# Patient Record
Sex: MALE | Race: WHITE | NOT HISPANIC OR LATINO | Employment: FULL TIME | URBAN - METROPOLITAN AREA
[De-identification: names, ages, dates, MRNs, and addresses within clinical notes are randomized per-mention and may not be internally consistent; named-entity substitution may affect disease eponyms.]

---

## 2017-05-02 ENCOUNTER — OFFICE VISIT (OUTPATIENT)
Dept: URGENT CARE | Facility: URGENT CARE | Age: 22
End: 2017-05-02

## 2017-05-02 ENCOUNTER — RADIANT APPOINTMENT (OUTPATIENT)
Dept: GENERAL RADIOLOGY | Facility: CLINIC | Age: 22
End: 2017-05-02
Attending: PHYSICIAN ASSISTANT

## 2017-05-02 VITALS
SYSTOLIC BLOOD PRESSURE: 126 MMHG | WEIGHT: 157 LBS | OXYGEN SATURATION: 96 % | TEMPERATURE: 97.8 F | DIASTOLIC BLOOD PRESSURE: 85 MMHG | HEART RATE: 88 BPM
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
## 2017-05-02 DIAGNOSIS — M54.2 NECK PAIN: ICD-10-CM

## 2017-05-02 DIAGNOSIS — M54.2 NECK PAIN: Primary | ICD-10-CM

## 2017-05-02 PROCEDURE — 72040 X-RAY EXAM NECK SPINE 2-3 VW: CPT

## 2017-05-02 PROCEDURE — 99203 OFFICE O/P NEW LOW 30 MIN: CPT | Performed by: PHYSICIAN ASSISTANT

## 2017-05-02 RX ORDER — AMITRIPTYLINE HYDROCHLORIDE 50 MG/1
50 TABLET ORAL
COMMUNITY
Start: 2017-02-04 | End: 2023-04-13

## 2017-05-02 RX ORDER — CYCLOBENZAPRINE HCL 10 MG
TABLET ORAL
COMMUNITY
Start: 2017-02-06 | End: 2023-04-13
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
## 2023-03-15 ENCOUNTER — NURSE TRIAGE (OUTPATIENT)
Dept: NURSING | Facility: CLINIC | Age: 28
End: 2023-03-15
Payer: COMMERCIAL

## 2023-03-15 NOTE — TELEPHONE ENCOUNTER
"Patient calling reporting falling on ice 3/9/23.    Reporting hitting the back of his head, denies loss of consciousness.    Reporting ongoing headaches, neck tenderness.    Denies swelling today.    Stating headache is \"5\" on 1-10 pain scale. History of migraines.    Disposition per triage guidelines to see provider today or tomorrow in clinic.    Reviewed walk in urgent care options.    Caller verbalized understanding. Denies further questions.    Noemy Davis RN  [facility]        Reason for Disposition    After 3 days and headache persists    Additional Information    Negative: ACUTE NEUROLOGIC SYMPTOM and symptom present now    Negative: Knocked out (unconscious) > 1 minute    Negative: Seizure (convulsion) occurred  (Exception: Prior history of seizures and now alert and without Acute Neurologic Symptoms.)    Negative: Neck pain after dangerous injury (e.g., MVA, diving, trampoline, contact sports, fall > 10 feet or 3 meters)  (Exception: Neck pain began > 1 hour after injury.)    Negative: Major bleeding (actively dripping or spurting) that can't be stopped    Negative: Penetrating head injury (e.g., knife, gunshot wound, metal object)    Negative: Sounds like a life-threatening emergency to the triager    Negative: Diagnosed with a concussion within last 14 days    Negative: Can't remember what happened (amnesia)    Negative: Vomiting once or more    Negative: Watery or blood-tinged fluid dripping from the nose or ears    Negative: ACUTE NEUROLOGIC SYMPTOM and now fine    Negative: Knocked out (unconscious) < 1 minute and now fine    Negative: Severe headache    Negative: Dangerous injury (e.g., MVA, diving, trampoline, contact sports, fall > 10 feet or 3 meters) or severe blow from hard object (e.g., golf club or baseball bat)    Negative: Large swelling or bruise (> 2 inches or 5 cm)    Negative: Skin is split open or gaping (length > 1/2 inch or 12 mm)    Negative: Bleeding won't stop after " 10 minutes of direct pressure (using correct technique)    Negative: One or two 'black eyes' (bruising, purple color of eyelids), and onset within 24 hours of head injury    Negative: Taking Coumadin (warfarin) or other strong blood thinner, or known bleeding disorder (e.g., thrombocytopenia)    Negative: Age over 65 years with an area of head swelling or bruise    Negative: Sounds like a serious injury to the triager    Negative: Patient is confused or is an unreliable provider of information (e.g., dementia, severe intellectual disability, alcohol intoxication)    Negative: No prior tetanus shots (or is not fully vaccinated) and any wound (e.g., cut or scrape)    Negative: HIV positive or severe immunodeficiency (severely weak immune system) and DIRTY cut or scrape    Negative: Patient wants to be seen    Negative: Last tetanus shot > 5 years ago and DIRTY cut or scrape    Negative: Last tetanus shot > 10 years ago and CLEAN cut or scrape    Protocols used: HEAD INJURY-A-[place]

## 2023-03-16 ENCOUNTER — OFFICE VISIT (OUTPATIENT)
Dept: FAMILY MEDICINE | Facility: CLINIC | Age: 28
End: 2023-03-16
Payer: COMMERCIAL

## 2023-03-16 VITALS
HEIGHT: 69 IN | BODY MASS INDEX: 24.29 KG/M2 | OXYGEN SATURATION: 99 % | TEMPERATURE: 99 F | RESPIRATION RATE: 17 BRPM | DIASTOLIC BLOOD PRESSURE: 84 MMHG | SYSTOLIC BLOOD PRESSURE: 138 MMHG | WEIGHT: 164 LBS | HEART RATE: 88 BPM

## 2023-03-16 DIAGNOSIS — G44.86 CERVICOGENIC HEADACHE: Primary | ICD-10-CM

## 2023-03-16 PROCEDURE — 99204 OFFICE O/P NEW MOD 45 MIN: CPT | Performed by: FAMILY MEDICINE

## 2023-03-16 RX ORDER — CYCLOBENZAPRINE HCL 10 MG
5-10 TABLET ORAL 3 TIMES DAILY PRN
Qty: 45 TABLET | Refills: 0 | Status: SHIPPED | OUTPATIENT
Start: 2023-03-16 | End: 2023-04-13

## 2023-03-16 ASSESSMENT — PAIN SCALES - GENERAL: PAINLEVEL: SEVERE PAIN (6)

## 2023-03-16 ASSESSMENT — ANXIETY QUESTIONNAIRES
GAD7 TOTAL SCORE: 13
2. NOT BEING ABLE TO STOP OR CONTROL WORRYING: MORE THAN HALF THE DAYS
7. FEELING AFRAID AS IF SOMETHING AWFUL MIGHT HAPPEN: SEVERAL DAYS
3. WORRYING TOO MUCH ABOUT DIFFERENT THINGS: NEARLY EVERY DAY
1. FEELING NERVOUS, ANXIOUS, OR ON EDGE: MORE THAN HALF THE DAYS
6. BECOMING EASILY ANNOYED OR IRRITABLE: SEVERAL DAYS
5. BEING SO RESTLESS THAT IT IS HARD TO SIT STILL: MORE THAN HALF THE DAYS
IF YOU CHECKED OFF ANY PROBLEMS ON THIS QUESTIONNAIRE, HOW DIFFICULT HAVE THESE PROBLEMS MADE IT FOR YOU TO DO YOUR WORK, TAKE CARE OF THINGS AT HOME, OR GET ALONG WITH OTHER PEOPLE: SOMEWHAT DIFFICULT
GAD7 TOTAL SCORE: 13

## 2023-03-16 ASSESSMENT — PATIENT HEALTH QUESTIONNAIRE - PHQ9
SUM OF ALL RESPONSES TO PHQ QUESTIONS 1-9: 13
5. POOR APPETITE OR OVEREATING: MORE THAN HALF THE DAYS

## 2023-03-16 NOTE — PATIENT INSTRUCTIONS
Important Takeaway Points From This Visit:  You can take tylenol 1000 mg three times a day for pain.  You can take flexeril 5-10 mg three times a day. This can make you sleepy.  You can take 400-600 mg of ibuprofen three times a day with food      As always, please call with any questions or concerns. I look forward to seeing you again soon!    Take care,  Dr. Mejia    Your current medication list is printed. Please keep this with you - it is helpful to bring this current list to any other medical appointments. It can also be helpful if you ever go to the emergency room or hospital.    If you had lab testing today we will call you with the results. The phone number we will call with your results is # [phone] (home) . If this is not the best number please call our clinic and change the number.    If you need any refills, please call your pharmacy and they will contact us.    If you have any further concerns or wish to schedule another appointment, please call our office at [phone].    If you have a medical emergency, please call [phone].    Thank you for coming to [facility]!
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
## 2023-03-17 ENCOUNTER — ANCILLARY PROCEDURE (OUTPATIENT)
Dept: GENERAL RADIOLOGY | Facility: CLINIC | Age: 28
End: 2023-03-17
Attending: FAMILY MEDICINE
Payer: COMMERCIAL

## 2023-03-17 DIAGNOSIS — G44.86 CERVICOGENIC HEADACHE: ICD-10-CM

## 2023-03-17 PROBLEM — F51.04 INSOMNIA, PSYCHOPHYSIOLOGICAL: Status: ACTIVE | Noted: 2018-02-20

## 2023-03-17 PROBLEM — F90.2 ADHD (ATTENTION DEFICIT HYPERACTIVITY DISORDER), COMBINED TYPE: Status: ACTIVE | Noted: 2019-02-19

## 2023-03-17 PROBLEM — Z87.898 HISTORY OF LEARNING DISABILITY: Status: ACTIVE | Noted: 2018-02-20

## 2023-03-17 PROBLEM — F41.9 ANXIETY AND DEPRESSION: Status: ACTIVE | Noted: 2019-05-09

## 2023-03-17 PROBLEM — F32.A ANXIETY AND DEPRESSION: Status: ACTIVE | Noted: 2019-05-09

## 2023-03-17 PROBLEM — F17.200 NICOTINE USE DISORDER: Status: ACTIVE | Noted: 2019-05-09

## 2023-03-17 PROCEDURE — 72040 X-RAY EXAM NECK SPINE 2-3 VW: CPT | Mod: TC | Performed by: RADIOLOGY

## 2023-03-17 NOTE — RESULT ENCOUNTER NOTE
Please inform of results if patient has not viewed in [employer] within 3 business days.    No fracture was seen on xray    Continue with the plan of care we discussed.    Please call the clinic with any questions you may have.     Have a great day,    Dr. Childress

## 2023-04-06 ASSESSMENT — ENCOUNTER SYMPTOMS
PARESTHESIAS: 0
FREQUENCY: 0
COUGH: 0
SORE THROAT: 0
JOINT SWELLING: 0
HEMATOCHEZIA: 0
ARTHRALGIAS: 0
PALPITATIONS: 0
DYSURIA: 0
HEARTBURN: 0
EYE PAIN: 0
DIARRHEA: 0
HEMATURIA: 0
WEAKNESS: 0
DIZZINESS: 0
SHORTNESS OF BREATH: 0
FEVER: 0
NERVOUS/ANXIOUS: 1
ABDOMINAL PAIN: 0
MYALGIAS: 0
CHILLS: 0
NAUSEA: 0
CONSTIPATION: 0
HEADACHES: 1

## 2023-04-06 ASSESSMENT — PATIENT HEALTH QUESTIONNAIRE - PHQ9
SUM OF ALL RESPONSES TO PHQ QUESTIONS 1-9: 13
SUM OF ALL RESPONSES TO PHQ QUESTIONS 1-9: 13
10. IF YOU CHECKED OFF ANY PROBLEMS, HOW DIFFICULT HAVE THESE PROBLEMS MADE IT FOR YOU TO DO YOUR WORK, TAKE CARE OF THINGS AT HOME, OR GET ALONG WITH OTHER PEOPLE: SOMEWHAT DIFFICULT

## 2023-04-13 ENCOUNTER — OFFICE VISIT (OUTPATIENT)
Dept: FAMILY MEDICINE | Facility: CLINIC | Age: 28
End: 2023-04-13
Payer: COMMERCIAL

## 2023-04-13 VITALS
HEIGHT: 69 IN | TEMPERATURE: 97.8 F | SYSTOLIC BLOOD PRESSURE: 137 MMHG | RESPIRATION RATE: 24 BRPM | WEIGHT: 163 LBS | BODY MASS INDEX: 24.14 KG/M2 | DIASTOLIC BLOOD PRESSURE: 86 MMHG | HEART RATE: 74 BPM | OXYGEN SATURATION: 98 %

## 2023-04-13 DIAGNOSIS — F33.9 MAJOR DEPRESSION, RECURRENT, CHRONIC (H): ICD-10-CM

## 2023-04-13 DIAGNOSIS — F41.9 ANXIETY: ICD-10-CM

## 2023-04-13 DIAGNOSIS — F10.20 ALCOHOLISM (H): ICD-10-CM

## 2023-04-13 DIAGNOSIS — Z00.00 ROUTINE HISTORY AND PHYSICAL EXAMINATION OF ADULT: Primary | ICD-10-CM

## 2023-04-13 DIAGNOSIS — F51.04 INSOMNIA, PSYCHOPHYSIOLOGICAL: ICD-10-CM

## 2023-04-13 LAB
ALBUMIN SERPL-MCNC: 4.4 G/DL (ref 3.4–5)
ALP SERPL-CCNC: 75 U/L (ref 40–150)
ALT SERPL W P-5'-P-CCNC: 24 U/L (ref 0–70)
ANION GAP SERPL CALCULATED.3IONS-SCNC: 2 MMOL/L (ref 3–14)
AST SERPL W P-5'-P-CCNC: 19 U/L (ref 0–45)
BASOPHILS # BLD AUTO: 0.1 10E3/UL (ref 0–0.2)
BASOPHILS NFR BLD AUTO: 1 %
BILIRUB SERPL-MCNC: 0.6 MG/DL (ref 0.2–1.3)
BUN SERPL-MCNC: 12 MG/DL (ref 7–30)
CALCIUM SERPL-MCNC: 9 MG/DL (ref 8.5–10.1)
CHLORIDE BLD-SCNC: 110 MMOL/L (ref 94–109)
CO2 SERPL-SCNC: 28 MMOL/L (ref 20–32)
CREAT SERPL-MCNC: 1.01 MG/DL (ref 0.66–1.25)
EOSINOPHIL # BLD AUTO: 0.2 10E3/UL (ref 0–0.7)
EOSINOPHIL NFR BLD AUTO: 2 %
ERYTHROCYTE [DISTWIDTH] IN BLOOD BY AUTOMATED COUNT: 12.2 % (ref 10–15)
GFR SERPL CREATININE-BSD FRML MDRD: >90 ML/MIN/1.73M2
GLUCOSE BLD-MCNC: 99 MG/DL (ref 70–99)
HCT VFR BLD AUTO: 46.4 % (ref 40–53)
HGB BLD-MCNC: 16.1 G/DL (ref 13.3–17.7)
LYMPHOCYTES # BLD AUTO: 2.6 10E3/UL (ref 0.8–5.3)
LYMPHOCYTES NFR BLD AUTO: 34 %
MCH RBC QN AUTO: 29.2 PG (ref 26.5–33)
MCHC RBC AUTO-ENTMCNC: 34.7 G/DL (ref 31.5–36.5)
MCV RBC AUTO: 84 FL (ref 78–100)
MONOCYTES # BLD AUTO: 0.6 10E3/UL (ref 0–1.3)
MONOCYTES NFR BLD AUTO: 8 %
NEUTROPHILS # BLD AUTO: 4.2 10E3/UL (ref 1.6–8.3)
NEUTROPHILS NFR BLD AUTO: 55 %
PLATELET # BLD AUTO: 208 10E3/UL (ref 150–450)
POTASSIUM BLD-SCNC: 4.2 MMOL/L (ref 3.4–5.3)
PROT SERPL-MCNC: 7.5 G/DL (ref 6.8–8.8)
RBC # BLD AUTO: 5.52 10E6/UL (ref 4.4–5.9)
SODIUM SERPL-SCNC: 140 MMOL/L (ref 133–144)
TSH SERPL DL<=0.005 MIU/L-ACNC: 1.53 MU/L (ref 0.4–4)
WBC # BLD AUTO: 7.7 10E3/UL (ref 4–11)

## 2023-04-13 PROCEDURE — 99214 OFFICE O/P EST MOD 30 MIN: CPT | Mod: 25 | Performed by: PHYSICIAN ASSISTANT

## 2023-04-13 PROCEDURE — 36415 COLL VENOUS BLD VENIPUNCTURE: CPT | Performed by: PHYSICIAN ASSISTANT

## 2023-04-13 PROCEDURE — 80050 GENERAL HEALTH PANEL: CPT | Performed by: PHYSICIAN ASSISTANT

## 2023-04-13 PROCEDURE — 99395 PREV VISIT EST AGE 18-39: CPT | Performed by: PHYSICIAN ASSISTANT

## 2023-04-13 RX ORDER — CITALOPRAM HYDROBROMIDE 20 MG/1
TABLET ORAL
Qty: 30 TABLET | Refills: 0 | Status: SHIPPED | OUTPATIENT
Start: 2023-04-13 | End: 2023-05-18

## 2023-04-13 ASSESSMENT — ENCOUNTER SYMPTOMS
SORE THROAT: 0
FREQUENCY: 0
WEAKNESS: 0
CHILLS: 0
DIARRHEA: 0
JOINT SWELLING: 0
ARTHRALGIAS: 0
PALPITATIONS: 0
NAUSEA: 0
CONSTIPATION: 0
HEMATOCHEZIA: 0
HEADACHES: 1
EYE PAIN: 0
SHORTNESS OF BREATH: 0
MYALGIAS: 0
FEVER: 0
HEARTBURN: 0
DIZZINESS: 0
NERVOUS/ANXIOUS: 1
PARESTHESIAS: 0
DYSURIA: 0
HEMATURIA: 0
COUGH: 0
ABDOMINAL PAIN: 0

## 2023-04-13 ASSESSMENT — PAIN SCALES - GENERAL: PAINLEVEL: NO PAIN (0)

## 2023-04-13 NOTE — PATIENT INSTRUCTIONS
Follow up psychiatry for advanced medications/therapy options such as mood stabilizers, ECT, ketamine.    Limit alcohol intake while on medications

## 2023-04-13 NOTE — PROGRESS NOTES
SUBJECTIVE:   CC: Alhaji is an 27 year old who presents for preventative health visit.       4/13/2023     9:16 AM   Additional Questions   Roomed by feli spangler   Accompanied by self     ASSESSMENT / PLAN:  Routine history and physical examination of adult  Comment: completed    (F33.9) Major depression, recurrent, chronic (H)  (primary encounter diagnosis)  Comment: Up and down  Plan: citalopram (CELEXA) 20 MG tablet, Adult Mental         Health  Referral    Reports depression history starting in 8th grade and continuing into adulthood. Has had suicide attempts in the past and frequently feels depressed although does not have a plan for suicide. No HI or SI at this visit.   Is pushed to be seen again and restart medications due to girlfriend.     Anxiety  Comment: Chronic  Plan: Start celexa and follow up with psychiatry    (F10.20) Alcoholism (H)  Comment: Chronic  Plan: Using to self-treat for disorders. Reports up to 5 beers per night.    (F51.04) Insomnia, psychophysiological  Comment: Chronic  Plan: CBC with platelets and differential, TSH with         free T4 reflex, Comprehensive metabolic panel         (BMP + Alb, Alk Phos, ALT, AST, Total. Bili,         TP), CANCELED: Comprehensive metabolic panel         (BMP + Alb, Alk Phos, ALT, AST, Total. Bili,         TP)                    4/13/2023     9:16 AM   Patient Reported Additional Medications   Patient reports taking the following new medications none     Patient has been advised of split billing requirements and indicates understanding: Yes  Healthy Habits:     Getting at least 3 servings of Calcium per day:  NO    Bi-annual eye exam:  NO    Dental care twice a year:  Yes    Sleep apnea or symptoms of sleep apnea:  Daytime drowsiness    Diet:  Regular (no restrictions) and Breakfast skipped    Frequency of exercise:  1 day/week    Duration of exercise:  30-45 minutes    Taking medications regularly:  No    Barriers to taking medications:  Problems  remembering to take them    Medication side effects:  None    PHQ-2 Total Score: 3    Additional concerns today:  No              Today's PHQ-2 Score:       4/6/2023     7:49 PM   PHQ-2 ( 1999 [employer])   Q1: Little interest or pleasure in doing things 1   Q2: Feeling down, depressed or hopeless 2   PHQ-2 Score 3   Q1: Little interest or pleasure in doing things Several days    Several days   Q2: Feeling down, depressed or hopeless More than half the days    More than half the days   PHQ-2 Score 3    3       Have you ever done Advance Care Planning? (For example, a Health Directive, POLST, or a discussion with a medical provider or your loved ones about your wishes): No, advance care planning information given to patient to review.  Patient declined advance care planning discussion at this time.    Social History     Tobacco Use     Smoking status: Every Day     Types: Dip, chew, snus or snuff, Other     Passive exposure: Never     Smokeless tobacco: Current     Types: Chew   Vaping Use     Vaping status: Every Day     Substances: Nicotine, Flavoring     Devices: Refillable tank   Substance Use Topics     Alcohol use: Yes             4/6/2023     7:49 PM   Alcohol Use   Prescreen: >3 drinks/day or >7 drinks/week? Yes   AUDIT SCORE  9         4/6/2023     7:49 PM   AUDIT - Alcohol Use Disorders Identification Test - Reproduced from the World Health Organization Audit 2001 (Second Edition)   1.  How often do you have a drink containing alcohol? 2 to 3 times a week   2.  How many drinks containing alcohol do you have on a typical day when you are drinking? 3 or 4   3.  How often do you have five or more drinks on one occasion? Monthly   4.  How often during the last year have you found that you were not able to stop drinking once you had started? Less than monthly   5.  How often during the last year have you failed to do what was normally expected of you because of drinking? Never   6.  How often during the last year  have you needed a first drink in the morning to get yourself going after a heavy drinking session? Never   7.  How often during the last year have you had a feeling of guilt or remorse after drinking? Less than monthly   8.  How often during the last year have you been unable to remember what happened the night before because of your drinking? Less than monthly   9.  Have you or someone else been injured because of your drinking? No   10. Has a relative, friend, doctor or other health care worker been concerned about your drinking or suggested you cut down? No   TOTAL SCORE 9       Last PSA: No results found for: PSA    Reviewed orders with patient. Reviewed health maintenance and updated orders accordingly - Yes  Lab work is in process    Reviewed and updated as needed this visit by clinical staff    Allergies               Reviewed and updated as needed this visit by Provider                 Past Medical History:   Diagnosis Date     Depressive disorder     Katelyn been dealing with depression  and anxiety.and adhd sience i was 8 or 9. I was also diagnosed  with borderline  personality disorder back in 2018      No past surgical history on file.    Review of Systems   Constitutional: Negative for chills and fever.   HENT: Negative for congestion, ear pain, hearing loss and sore throat.    Eyes: Negative for pain and visual disturbance.   Respiratory: Negative for cough and shortness of breath.    Cardiovascular: Negative for chest pain, palpitations and peripheral edema.   Gastrointestinal: Negative for abdominal pain, constipation, diarrhea, heartburn, hematochezia and nausea.   Genitourinary: Negative for dysuria, frequency, genital sores, hematuria, impotence, penile discharge and urgency.   Musculoskeletal: Negative for arthralgias, joint swelling and myalgias.   Skin: Negative for rash.   Neurological: Positive for headaches. Negative for dizziness, weakness and paresthesias.   Psychiatric/Behavioral: Negative  "for mood changes. The patient is nervous/anxious.        OBJECTIVE:   BP (!) 155/91   Pulse 74   Temp 97.8  F (36.6  C) (Tympanic)   Resp 24   Ht 1.74 m (5' 8.5\")   Wt 73.9 kg (163 lb)   SpO2 98%   BMI 24.42 kg/m      Physical Exam  GENERAL: healthy, alert and no distress  NECK: no adenopathy, no asymmetry, masses, or scars and thyroid normal to palpation  RESP: lungs clear to auscultation - no rales, rhonchi or wheezes  CV: regular rate and rhythm, normal S1 S2, no S3 or S4, no murmur, click or rub, no peripheral edema and peripheral pulses strong  ABDOMEN: soft, nontender, no hepatosplenomegaly, no masses and bowel sounds normal  MS: no gross musculoskeletal defects noted, no edema          COUNSELING:   Reviewed preventive health counseling, as reflected in patient instructions       Healthy diet/nutrition       Alcohol Use         He reports that he has been smoking dip, chew, snus or snuff and other. He has never been exposed to tobacco smoke. His smokeless tobacco use includes chew.  Nicotine/Tobacco Cessation Plan:   Self help information given to patient            [facility]  Answers for HPI/ROS submitted by the patient on 4/6/2023  If you checked off any problems, how difficult have these problems made it for you to do your work, take care of things at home, or get along with other people?: Somewhat difficult  PHQ9 TOTAL SCORE: 13      "

## 2023-04-16 ENCOUNTER — HEALTH MAINTENANCE LETTER (OUTPATIENT)
Age: 28
End: 2023-04-16

## 2023-05-11 ASSESSMENT — ANXIETY QUESTIONNAIRES
1. FEELING NERVOUS, ANXIOUS, OR ON EDGE: MORE THAN HALF THE DAYS
7. FEELING AFRAID AS IF SOMETHING AWFUL MIGHT HAPPEN: SEVERAL DAYS
3. WORRYING TOO MUCH ABOUT DIFFERENT THINGS: MORE THAN HALF THE DAYS
GAD7 TOTAL SCORE: 16
GAD7 TOTAL SCORE: 16
4. TROUBLE RELAXING: NEARLY EVERY DAY
2. NOT BEING ABLE TO STOP OR CONTROL WORRYING: MORE THAN HALF THE DAYS
7. FEELING AFRAID AS IF SOMETHING AWFUL MIGHT HAPPEN: SEVERAL DAYS
5. BEING SO RESTLESS THAT IT IS HARD TO SIT STILL: NEARLY EVERY DAY
GAD7 TOTAL SCORE: 16
7. FEELING AFRAID AS IF SOMETHING AWFUL MIGHT HAPPEN: SEVERAL DAYS
3. WORRYING TOO MUCH ABOUT DIFFERENT THINGS: MORE THAN HALF THE DAYS
2. NOT BEING ABLE TO STOP OR CONTROL WORRYING: MORE THAN HALF THE DAYS
IF YOU CHECKED OFF ANY PROBLEMS ON THIS QUESTIONNAIRE, HOW DIFFICULT HAVE THESE PROBLEMS MADE IT FOR YOU TO DO YOUR WORK, TAKE CARE OF THINGS AT HOME, OR GET ALONG WITH OTHER PEOPLE: SOMEWHAT DIFFICULT
4. TROUBLE RELAXING: NEARLY EVERY DAY
GAD7 TOTAL SCORE: 16
8. IF YOU CHECKED OFF ANY PROBLEMS, HOW DIFFICULT HAVE THESE MADE IT FOR YOU TO DO YOUR WORK, TAKE CARE OF THINGS AT HOME, OR GET ALONG WITH OTHER PEOPLE?: SOMEWHAT DIFFICULT
GAD7 TOTAL SCORE: 16
5. BEING SO RESTLESS THAT IT IS HARD TO SIT STILL: NEARLY EVERY DAY
6. BECOMING EASILY ANNOYED OR IRRITABLE: NEARLY EVERY DAY
1. FEELING NERVOUS, ANXIOUS, OR ON EDGE: MORE THAN HALF THE DAYS
7. FEELING AFRAID AS IF SOMETHING AWFUL MIGHT HAPPEN: SEVERAL DAYS
8. IF YOU CHECKED OFF ANY PROBLEMS, HOW DIFFICULT HAVE THESE MADE IT FOR YOU TO DO YOUR WORK, TAKE CARE OF THINGS AT HOME, OR GET ALONG WITH OTHER PEOPLE?: SOMEWHAT DIFFICULT
GAD7 TOTAL SCORE: 16
IF YOU CHECKED OFF ANY PROBLEMS ON THIS QUESTIONNAIRE, HOW DIFFICULT HAVE THESE PROBLEMS MADE IT FOR YOU TO DO YOUR WORK, TAKE CARE OF THINGS AT HOME, OR GET ALONG WITH OTHER PEOPLE: SOMEWHAT DIFFICULT
6. BECOMING EASILY ANNOYED OR IRRITABLE: NEARLY EVERY DAY

## 2023-05-13 ASSESSMENT — ENCOUNTER SYMPTOMS
WEAKNESS: 0
SORE THROAT: 0
CONSTIPATION: 0
ARTHRALGIAS: 0
FEVER: 0
FREQUENCY: 0
HEMATURIA: 0
COUGH: 0
MYALGIAS: 0
DIARRHEA: 0
HEMATOCHEZIA: 0
NAUSEA: 0
ABDOMINAL PAIN: 0
CHILLS: 0
PALPITATIONS: 0
SHORTNESS OF BREATH: 0
NERVOUS/ANXIOUS: 1
DYSURIA: 0
HEARTBURN: 0
JOINT SWELLING: 0
EYE PAIN: 0
PARESTHESIAS: 0
DIZZINESS: 0
HEADACHES: 1

## 2023-05-13 ASSESSMENT — PATIENT HEALTH QUESTIONNAIRE - PHQ9
SUM OF ALL RESPONSES TO PHQ QUESTIONS 1-9: 18
SUM OF ALL RESPONSES TO PHQ QUESTIONS 1-9: 18
10. IF YOU CHECKED OFF ANY PROBLEMS, HOW DIFFICULT HAVE THESE PROBLEMS MADE IT FOR YOU TO DO YOUR WORK, TAKE CARE OF THINGS AT HOME, OR GET ALONG WITH OTHER PEOPLE: SOMEWHAT DIFFICULT

## 2023-05-17 ASSESSMENT — PATIENT HEALTH QUESTIONNAIRE - PHQ9
10. IF YOU CHECKED OFF ANY PROBLEMS, HOW DIFFICULT HAVE THESE PROBLEMS MADE IT FOR YOU TO DO YOUR WORK, TAKE CARE OF THINGS AT HOME, OR GET ALONG WITH OTHER PEOPLE: SOMEWHAT DIFFICULT
SUM OF ALL RESPONSES TO PHQ QUESTIONS 1-9: 17
SUM OF ALL RESPONSES TO PHQ QUESTIONS 1-9: 17
10. IF YOU CHECKED OFF ANY PROBLEMS, HOW DIFFICULT HAVE THESE PROBLEMS MADE IT FOR YOU TO DO YOUR WORK, TAKE CARE OF THINGS AT HOME, OR GET ALONG WITH OTHER PEOPLE: SOMEWHAT DIFFICULT
SUM OF ALL RESPONSES TO PHQ QUESTIONS 1-9: 17
SUM OF ALL RESPONSES TO PHQ QUESTIONS 1-9: 17

## 2023-05-18 ENCOUNTER — TELEPHONE (OUTPATIENT)
Dept: BEHAVIORAL HEALTH | Facility: CLINIC | Age: 28
End: 2023-05-18
Payer: COMMERCIAL

## 2023-05-18 ENCOUNTER — VIRTUAL VISIT (OUTPATIENT)
Dept: BEHAVIORAL HEALTH | Facility: CLINIC | Age: 28
End: 2023-05-18
Payer: COMMERCIAL

## 2023-05-18 ENCOUNTER — VIRTUAL VISIT (OUTPATIENT)
Dept: PSYCHIATRY | Facility: CLINIC | Age: 28
End: 2023-05-18
Attending: PHYSICIAN ASSISTANT
Payer: COMMERCIAL

## 2023-05-18 DIAGNOSIS — F41.1 GAD (GENERALIZED ANXIETY DISORDER): Primary | ICD-10-CM

## 2023-05-18 DIAGNOSIS — F17.200 TOBACCO USE DISORDER: ICD-10-CM

## 2023-05-18 DIAGNOSIS — F33.1 MODERATE EPISODE OF RECURRENT MAJOR DEPRESSIVE DISORDER (H): Primary | ICD-10-CM

## 2023-05-18 DIAGNOSIS — F90.9 ATTENTION DEFICIT HYPERACTIVITY DISORDER (ADHD), UNSPECIFIED ADHD TYPE: ICD-10-CM

## 2023-05-18 DIAGNOSIS — F33.1 MAJOR DEPRESSIVE DISORDER, RECURRENT EPISODE, MODERATE (H): ICD-10-CM

## 2023-05-18 DIAGNOSIS — F17.200 NICOTINE DEPENDENCE, UNCOMPLICATED, UNSPECIFIED NICOTINE PRODUCT TYPE: ICD-10-CM

## 2023-05-18 DIAGNOSIS — F33.9 MAJOR DEPRESSION, RECURRENT, CHRONIC (H): ICD-10-CM

## 2023-05-18 DIAGNOSIS — F60.3 BORDERLINE PERSONALITY DISORDER (H): ICD-10-CM

## 2023-05-18 DIAGNOSIS — F41.1 GENERALIZED ANXIETY DISORDER: ICD-10-CM

## 2023-05-18 PROCEDURE — 90791 PSYCH DIAGNOSTIC EVALUATION: CPT | Mod: VID | Performed by: SOCIAL WORKER

## 2023-05-18 PROCEDURE — 99205 OFFICE O/P NEW HI 60 MIN: CPT | Mod: VID | Performed by: STUDENT IN AN ORGANIZED HEALTH CARE EDUCATION/TRAINING PROGRAM

## 2023-05-18 RX ORDER — METHYLPHENIDATE HYDROCHLORIDE 18 MG/1
18 TABLET ORAL EVERY MORNING
Qty: 30 TABLET | Refills: 0 | Status: SHIPPED | OUTPATIENT
Start: 2023-05-18 | End: 2023-05-19

## 2023-05-18 RX ORDER — CITALOPRAM HYDROBROMIDE 20 MG/1
30 TABLET ORAL AT BEDTIME
Qty: 45 TABLET | Refills: 1 | Status: SHIPPED | OUTPATIENT
Start: 2023-05-18 | End: 2023-06-09

## 2023-05-18 ASSESSMENT — COLUMBIA-SUICIDE SEVERITY RATING SCALE - C-SSRS
6. HAVE YOU EVER DONE ANYTHING, STARTED TO DO ANYTHING, OR PREPARED TO DO ANYTHING TO END YOUR LIFE?: NO
1. HAVE YOU WISHED YOU WERE DEAD OR WISHED YOU COULD GO TO SLEEP AND NOT WAKE UP?: YES
1. IN THE PAST MONTH, HAVE YOU WISHED YOU WERE DEAD OR WISHED YOU COULD GO TO SLEEP AND NOT WAKE UP?: YES
4. HAVE YOU HAD THESE THOUGHTS AND HAD SOME INTENTION OF ACTING ON THEM?: NO
ATTEMPT LIFETIME: NO
2. HAVE YOU ACTUALLY HAD ANY THOUGHTS OF KILLING YOURSELF?: YES
5. HAVE YOU STARTED TO WORK OUT OR WORKED OUT THE DETAILS OF HOW TO KILL YOURSELF? DO YOU INTEND TO CARRY OUT THIS PLAN?: YES
TOTAL  NUMBER OF ABORTED OR SELF INTERRUPTED ATTEMPTS LIFETIME: NO
5. HAVE YOU STARTED TO WORK OUT OR WORKED OUT THE DETAILS OF HOW TO KILL YOURSELF? DO YOU INTEND TO CARRY OUT THIS PLAN?: NO
3. HAVE YOU BEEN THINKING ABOUT HOW YOU MIGHT KILL YOURSELF?: NO
TOTAL  NUMBER OF INTERRUPTED ATTEMPTS LIFETIME: 1
4. HAVE YOU HAD THESE THOUGHTS AND HAD SOME INTENTION OF ACTING ON THEM?: NO
TOTAL  NUMBER OF INTERRUPTED ATTEMPTS PAST 3 MONTHS: NO
TOTAL  NUMBER OF INTERRUPTED ATTEMPTS LIFETIME: YES
2. HAVE YOU ACTUALLY HAD ANY THOUGHTS OF KILLING YOURSELF?: YES
ATTEMPT PAST THREE MONTHS: NO

## 2023-05-18 NOTE — NURSING NOTE
Is the patient currently in the state of [place]? YES    Visit mode:VIDEO    If the visit is dropped, the patient can be reconnected by: VIDEO VISIT: Text to cell phone: [phone]    Will anyone else be joining the visit? NO      How would you like to obtain your AVS? MyChart    Are changes needed to the allergy or medication list? NO    Reason for visit: Video Visit      Care team has reviewed attendance agreement with patient. Patient advised that two failed appointments within 6 months may lead to termination of current episode of care.      Violeta Sanchez VF

## 2023-05-18 NOTE — PROGRESS NOTES
"Virtual Visit Details    Type of service:  Video Visit     Originating Location (pt. Location): Home    Distant Location (provider location):  Off-site  Platform used for Video Visit: OneName   ADMINISTRATIVE BILLING:    Time spent interviewing patient, reviewing referral documents, obtaining and reviewing outside records, communication with other health specialists, and preparing this report on today's date  Video start: 753  Video stop: 08  Total time: 62 mins      DIAGNOSTIC PSYCHIATRIC ASSESSMENT     Name:  Alhaji Sanchez  : 1995     Patient is a 27 year old year old White, male  who presents for intake and medication management with [facility].  Patient was referred by PCP.   Patient attended the session alone.     Patient care team: Patient Care Team:  Gerry Vuong PA-C as PCP - General  Gerry Vuong PA-C as Assigned PCP    Available records in [facility] and/or Care Everywhere were reviewed today.   Per PCP on date of referral: \"stablizing long-standing severe depression/possible mood disorder\"    LANGUAGE OR COMMUNICATION BARRIERS   Are there language or communication issues or need for modification in treatment? No   Are there ethnic, cultural or Rastafarian factors that may be relevant for therapy? No  Client identified their preferred language to be fluent English in conversational context  Does the client need the assistance of an  or other support involved in therapy? No                                                 CHIEF COMPLAINT   Patient is a 27 year old,  White, male who presents for initial psychiatric evaluation with the [facility] ([facility]) for evaluation of depression, anxiety and ADHD, BPD.      HISTORY OF PRESENT ILLNESS     Per [facility] Tracie Payne during today's team-based visit:   \"Mental health issues started in 8th grade with an inpatient psychiatric admission due to threatening to stab principal and parents. Saw several therapists and " "psychologists as a child. Tried to avoid treatment. Remembers he was a \"very angry child.\" Tries to forget about his past but knows it has \"followed him.\" Times where he has put his head into the wall and rollercoaster of emotions. Bottling up his feelings and admitted to [facility] for 3 months in 2018. Ended up in and out of mental health hospitals 5 times from 2018 to 2019. Not happy with the medications and decided to go off all his medications in 2019 and has been without since then. Struggled with remembering to take his medications because there were six different ones at different times of the day. Felt like they weren't working.   Quick to anger. Overreacts and snaps at others, even his boss. Hygiene and motivation varies. Starting things and not getting anything done, at both work and home. Hard to stay on task. Easily distracted. Overthinking all the time. Knows he prefers to do things that feel good. Can get hyperfocused at times.  Dx: anxiety, depression and ADHD as a child. Borderline Personality Disorder in 2019. Provided psycheducation.  SH: started when he was 9 years old. Used to cut on his arms. Stopped in high school. Has thought about it since then and did cut in 2019, needing stitches.  SI: sitting on the freeway and thinking about ending his life. Plans to walk out into traffic and lay down and get run over. Interrupted because a friend called and needed help. In 2019.  Still has SI now but not have an active plan or intent. More feels ready that life might end if something happens. New relationship and she has two kids so he wants to improve things for her and them. Discussed crisis resources and he is aware. Discussed [facility].  Sleep: some nights able to fall asleep and then wakes up and not get back to sleep. Some nights of not getting to sleep and staying up late. Some nights of not sleeping at all. Takes naps during the day. Feels tired all the time, even with 8 hours of good " "sleep.  Appetite: eats one meal a day. Forgets to eat, not feel hungry.  Grandfather  in . Was his best friend. Bio father  when he was 5 months old. Grandfather became like a father.\"    ---Psychiatry Evaluation---    Pt agrees with assessment above.  Pt's girlfriend \"deals with a lot of the stuff I do.\" His girlfriend recommended he get help for his mental health. Pt has buried his emotions the past few years. He has been opening up to her about his dep/anx and has been having more anger outbursts. He was doing a lot of speeding down the highway between 2019-present but now has been feeling that these emotions are uncontrollable and is reacting in anger to cope.     Pt says he was on six different medicines at one time back in 2019. \"I can't even tell you all the medicines I was taking.\"  He was dx with borderline personality disorder in 2019 and agrees with the symptoms. He was also diagnosed with treatment resistant depression. \"I took that as I'm messed up in the head and I got to deal with it.\"         PSYCHIATRIC REVIEW OF SYSTEMS:   Per [facility] Tracie Payne during today's team-based visit:   PHQ9 score is 17 indicating moderately severe depression.  Suicidal ideation:  chronic, no intent or plan  GAD7 score is  is  16 indicating severe anxiety.  Depression:     Change in sleep, Lack of interest, Excessive or inappropriate guilt, Change in energy level, Difficulties concentrating, Change in appetite, Psychomotor slowing or agitation, Suicidal ideation, Feelings of hopelessness, Feelings of helplessness, Low self-worth, Ruminations, Irritability, Feeling sad, down, or depressed, Withdrawn, Poor hygeine and Anger outbursts  Christi:             Irritability, Racing thoughts, Increased activity, Decreased need for sleep, Restlessness and Impulsiveness not dx: Bipolar  Psychosis:       No Symptoms  Anxiety:           Excessive worry, Nervousness, Physical complaints, such as headaches, stomachaches, " "muscle tension, Sleep disturbance, Ruminations, Poor concentration and Irritability chronic migraines, overthinking all the time  Panic:              Shortness of breath and clammy not often, not feel like full blown panic  Post Traumatic Stress Disorder:  No Symptoms   Eating Disorder:          No Symptoms  ADD / ADHD:              Inattentive, Poor task completion, Poor organizational skills, Distractibility, Forgetful, Impulsive, Restlessness/fidgety and Hyperactive  Conduct Disorder:       No symptoms  Autism Spectrum Disorder:     No symptoms  Obsessive Compulsive Disorder:       No Symptoms    SUBSTANCE USE HISTORY    Tobacco use: \"enough to kill a horse\"; 12mg nicotine/daily in e-cig PLUS 1 tin of chew per month  Caffeine:  Yes  4+ cups/day of coffee, 4 sodas/day  Current alcohol:  A few beers per night. Hx alcohol abuse.   Current substance use: denies  Past use alcohol/substance use: cannabis last 2018, amphetamines rx last 2020, hallucinogens in the past, benzos last rx 2019    PSYCHIATRIC HISTORY:   Past psychiatric diagnoses:   Reports hx ADHD, BPD, dep, anx  Record indicates BPD, Depression, Anxiety, ADHD, Bipolar disorder    Past psychiatric history and treatment:  Previous psychiatry: Hx psychiatry for many years through [facility] last 2019  Previous therapist: Hx therapy through [facility] last 2019  History of Psychiatric Hospitalizations:   - Inpatient: 2010 x 3w for HI; 2018 for SI; says 5 inpt hosp. Between [id number]  - IOP/PHP/Day treatment: DBT in remote past, not helpful. PHP 2017  History of Suicidal Ideation: chronic SI; no plan or intent today  History of Suicide Attempts: per record   \"Reports numerous suicide attempts including \"trying to hang myself, choking myself out, laying in front of the railroad tracks, jumping off a bridge\"\"  History of Self-injurious Behavior: cutting  History of impulsivity: Yes   History of Violence/Aggression: HI in 2010 and subsequent 3w hospitalization.   History " "of Commitment? No   Electroconvulsive Therapy (ECT) or Transcranial Magnetic Stimulation (TMS): No   PharmacogenomicTesting (such as GeneSight): No     SOCIAL HISTORY                                         Per [facility] Tracie Payne during today's team-based visit:  \"Patient reported they grew up in [place]  .  They were raised by biological mother; stepfather.  His bio father  when he was 5 months old and mother remarried. Parents were always together.  Step-father had three children and patient has a younger half sister. Patient reported that their childhood was \"did my own thing.\" Stepfather was around but not super involved. Odin things from his grandfather or learned on his own. Otherwise normal- denies abuse or neglect.  Patient described their current relationships with family of origin as: grandfather passed away in . Tension in relationship with mother.   The patient describes their cultural background as .  Cultural influences and impact on patient's life structure, values, norms, and healthcare: N/a.  Contextual influences on patient's health include: Contextual Factors: Individual Factors nothing identified.    These factors will be addressed in the Preliminary Treatment plan. Patient identified their preferred language to be English. Patient reported they does not need the assistance of an  or other support involved in therapy.   Patient reported had no significant delays in developmental tasks.   Patient's highest education level was high school graduate  .  Patient identified the following learning problems: attention, concentration and not able to learn material or sit still. Took longer to learn anything- told slight dyslexia because not able to take notes and keep up. Not able to study on his own. Grades were average. Got by until high school and then attendance dropped. Modifications will not be used to assist communication in therapy. Patient reports they are  " "able to understand written materials. Still takes a long time to read and write. Easily distracted.  Patient reported the following relationship history: started dating at age [age]  Patient's current relationship status is has a partner or significant other for a few months.  No major relationships in his past. Engaged at age 21 and stayed single a long time.  Patient identified their sexual orientation as heterosexual.  Patient reported having zero child(gerardo). Patient identified partner; pets; friends as part of their support system.  Patient identified the quality of these relationships as good.   Patient's current living/housing situation involves staying with someone.  The immediate members of family and household include Neha Sanchez, 56,Mom and they report that housing is stable. Stays with his GF most of the time.  Patient is currently employed fulltime.  [employer] and looking for more long term career. Patient reports their finances are obtained through employment. Patient does identify finances as a current stressor.    Patient reported that they have not been involved with the legal system. Public intox at age 21. Patient does not report being under probation/ parole/ jurisdiction.\"    MEDICATIONS                                                                                              Current medications reviewed today and are noted below.   Current psychotropic medications:   Citalopram 20mg nightly     Past psychotropic medications:  Lithium  Quetiapine  Lorazepam  Adderall - low appetite  Amitriptyline  Escitalopram  Hydroxyzine  Duloxetine  Sertraline  Vyvanse  Lurasidone  Trazodone  Desvenlafaxine  Bupropion   Haloperidol  Lamotrigine - caused anger  Propranolol - for headaches, not sure if it helped for anxiety    Supplements:   See below      No recent rx    Current Outpatient Medications   Medication Sig     citalopram (CELEXA) 20 MG tablet Take 1/2 tablet daily x 6 days and then take " 1 tablet daily     No current facility-administered medications for this visit.        VITALS   There were no vitals taken for this visit.    Pulse Readings from Last 5 Encounters:   04/13/23 74   03/16/23 88   05/02/17 88     Wt Readings from Last 5 Encounters:   04/13/23 73.9 kg (163 lb)   03/16/23 74.4 kg (164 lb)   05/02/17 71.2 kg (157 lb)     BP Readings from Last 5 Encounters:   04/13/23 137/86   03/16/23 138/84   05/02/17 126/85       LABS & IMAGING                                                                                                                Recent available labs reviewed today.    Recent Labs   Lab Test 04/13/23  1002   CR 1.01   GFRESTIMATED >90     Recent Labs   Lab Test 04/13/23  1002   AST 19   ALT 24   ALKPHOS 75     Recent Labs   Lab Test 04/13/23  1002   TSH 1.53     ECG none on file    ALLERGY & IMMUNIZATIONS       Allergies   Allergen Reactions     Lurasidone Other (See Comments)     Mold      Other reaction(s): Runny Nose     Neomycin Unknown     burning in ears- ear drops       MEDICAL & SURGICAL HISTORY    Reviewed past medical and surgical history today.   Pregnant - NA.   Hx seizures or head injuries - Yes from snowboarding    Past Medical History:   Diagnosis Date     Depressive disorder     Katelyn been dealing with depression  and anxiety.and adhd sience i was 8 or 9. I was also diagnosed  with borderline  personality disorder back in 2018       FAMILY MEDICAL AND PSYCHIATRIC HISTORY     Family History   Problem Relation Age of Onset     Depression Mother      Anxiety Disorder Mother      Thyroid Disease Maternal Grandmother      Asthma Sister        Family history of sudden or unexplained death or an event requiring resuscitation in children or young adults, cardiac arrhythmias (eg, Reza-Parkinson-White syndrome), long QT syndrome, catecholaminergic paroxysmal ventricular tachycardia, Brugada syndrome, arrhythmogenic right ventricular dysplasia, hypertrophic cardiomyopathy,  "dilated cardiomyopathy, or Marfan syndrome?  No    Family psychiatric history: mom with dep, anx, hx attempts,  Family substance use history:   paternal uncle and great uncle with AUD  Family suicide history: denies  Medications family responded to: Unknown     MEDICAL REVIEW OF SYSTEMS:   10 systems (general, cardiovascular, respiratory, eyes, ENT, endocrine, GI, , M/S, neurological) were reviewed. Most pertinent finding(s) is/are: recurrent migraines, treated with excedrin. The remaining systems are all unremarkable.    MENTAL STATUS EXAM:     Alertness: alert  and oriented  Appearance: adequately groomed  Behavior/Demeanor: cooperative, pleasant and calm, with good eye contact   Speech: normal and regular rate and rhythm  Language: intact and no problems  Psychomotor: normal or unremarkable  Mood: description consistent with euthymia  Affect: full range and appropriate; was congruent to mood; was congruent to content  Thought Process/Associations: unremarkable  Thought Content:  Reports chronic suicidal ideation, no plan/intent;  Denies suicidal ideation, violent ideation and delusions  Perception:  Reports none;  Denies auditory hallucinations and visual hallucinations  Insight: intact  Judgment: intact  Cognition: does  appear grossly intact; formal cognitive testing was not done  Gait and Station: MERLIN    RISK AND PROTECTIVE FACTORS     Static Risk Factors: sex, prior attempts, serious mental illness, history of MH diagnoses and/or treatment, history of hospitalization and impulsivity  Firearms/Weapons Access: Yes pt has \"a lot of firearms.\" Going to the range and hunting are \"coping mechanisms. I love hunting.\" Says he would never shoot himself because \"I don't want anyone to clean that up.\"   Dynamic Risk Factors: SI, emotional distress, substance use, anxiety, dramatic changes in mood, access to means and mental health stigma    Protective Factors: hope for the future, compliance with medication, future " oriented, healthy intimate relationships, resilience, perceived internal locus of control, social support, access to care as needed, motivation and readiness for change, reasons for living and displaying help seeking behavior    SAFETY ASSESSMENT     Based on review of above risk and protective factors and today's exam, pt is at low acute risk of harm to self or others and chronic elevated risk due to history and risk factors. He does not meet criteria for a 72 hr hold and remains appropriate for ongoing outpatient care. The patient reports chronic SI without plan or intent. He wants to stay alive for his girlfriend and her two children. There was no deceit detected, and the patient presented in a manner that was believable. Local community safety resources printed and reviewed for patient to use if needed.    Recommended that patient call 911 or go to the local ED should there be a change in any of these risk factors.    DSM 5 DIAGNOSIS     Attention-Deficit/Hyperactivity Disorder  314.01 (F90.9) Unspecified Attention -Deficit / Hyperactivity Disorder  296.32 (F33.1) Major Depressive Disorder, Recurrent Episode, Moderate _  300.02 (F41.1) Generalized Anxiety Disorder  Substance-Related & Addictive Disorders 291.9 (F10.99) Unspecified Alcohol Related Disorder  Tobacco Use Disorder.  Specify if: In a controlled environment, Specify current severity:  305.1 (F17.200) Severe  301.83 (F60.3) Borderline Personality Disorder    DIFFERENTIAL DIAGNOSIS: caffeine use disorder    Medical comorbidities impacting or contributing to clinical picture: migraine headaches  Known issue that I take into account for their medical decisions, no current exacerbations or new concerns.    ASSESSMENT AND PLAN      ASSESSMENT:  Alhaji Sanchez is a 27 year old White, male who presents for initial visit with [facility] ([facility]) for medication management. Pt with hx of borderline PD, anxiety, depression, ADHD, AUD,  nicotine dependence presents for eval/tx of his anxiety, ADHD, and anger outbursts today. Pt describes extensive MH treatment through [facility] last in 2019; he stopped care and D/C meds because he was overwhelmed and felt like treatment was ineffective. Recently he has been having more problems with mood, anxiety, and irritability/anger and wants to try MH care again. He was started on citalopram by his PCP and perceives slight benefit on mood/anxiety; discussed increasing to maximize effect as he denies SE problems too. He agrees. Reviewed his hx of ADHD and impacts that untreated ADHD can have on substance use, emotion regulation, and mood/anxiety. Pt agrees to trial of Concerta for ADHD sx; reviewed r/b/se. Recommended pt consider therapy and will continue to discuss next visit. Advised RTC in 3 weeks if possible but may not be able to due to his work schedule. Pt with chronic SI without plan/intent. He remains chronic elevated risk due to his history and extensive access to firearms (for sport and recreation; will not remove them).    TREATMENT PLAN: Medication side effects and alternatives reviewed. Health promotion activities recommended and reviewed. All questions addressed. Education and counseling completed regarding risks and benefits of medications and psychotherapy options. Collaborative Care Psychiatry Service model reviewed today. Recommend therapy for additional support. Safety plan reviewed as indicated.     MEDICATIONS:   -increase CITALOPRAM to 30mg at bedtime  -start CONCERTA 18mg daily in the morning    LABS/RADS:   -none    PATIENT STATUS:  [facility] MD/DO/NP/PA providers offer care a specialty service for Primary Care Providers in the [facility] that seek to optimize psychotropic medications for unstable patients.  Once medications have been optimized, our providers discharge the patient back to the referring Primary Care Provider for ongoing medication management.  This type of system allows our  providers to serve a high volume of patients.   -[facility]    PSYCHOSOCIAL:   -consider therapy  -Follow up with primary care provider as planned or for acute medical concerns.    PSYCHOEDUCATION:  Medication side effects and alternatives reviewed. Health promotion activities recommended and reviewed today. All questions addressed. Education and counseling completed regarding risks and benefits of medications and psychotherapy options.  Consent provided by patient/guardian  Call the psychiatric nurse line with medication questions or concerns at [phone].  [employer] En[employer] may be used to communicate with your provider, but this is not intended to be used for emergencies.  BLACK BOX WARNING: Discussed the Food and Drug Administration (FDA) requires that all antidepressants carry a warning that some children, adolescents and young adults may be at increased risk of suicide when taking antidepressants. Anyone taking an antidepressant should be watched closely for worsening depression or unusual behavior especially in the first few weeks after starting an SSRI. Keep in mind, antidepressants are more likely to reduce suicide risk in the long run by improving mood.   STIMULANT THERAPY: Side effects discussed including but not limited to cardiac (including HTN, tachycardia, sudden death), motor/tic, appetite/growth, mood lability and sleep disruption. This is a controlled substance with risk for abuse, need to keep in a safe keep place and cannot replace lost scripts  HARM REDUCTION:  Discussions regarding effects of mood altering substances, alcohol and cannabis, on mood and that approach is harm reduction, will continue to prescribe meds as they work to cut back use.    Medlineplus.gov is information for patients.  It is run by the [employer] and it contains information about all disorders, diseases and all medications.      FOLLOW-UP: Follow up in 3-4  weeks    1. Continue all other treatments (including medications) per primary care provider and/or specialists.   2. To schedule individual or family therapy, call [facility] at [phone].   3. Follow up with primary care provider as planned or for acute medical concerns.  4. Call the psychiatric nurse line with medication questions or concerns at [phone] or [phone].  5. [employer] may be used to communicate with your care team, but this is not intended to be used for emergencies.    CRISIS RESOURCES:    1. Present to the Emergency Department as needed or call after hours crisis line at [phone] or [phone].   2. [place] Crisis Text Line: Text [place] to 696324.  3. Suicide LifeLine Chat: suicidepreLoveThisline.org/chat/.  4. National Suicide Prevention Lifeline: 273.329.8865 (TTY: 115.143.6906). Call anytime for help.  (www.suicidepreventionlifeline.org)  5. National Paicines on Mental Illness (www.art.org): 661.491.4062 or 932-472-7515.  6. Mental Health Association (www.mentalhealth.org): 316.549.6449 or 228-564-4426.      Signed:   Rambo Crum DNP, PMRASHIP-BC  Collaborative Care Psychiatry Service ([facility])

## 2023-05-18 NOTE — PROGRESS NOTES
[facility]iatry Services [facility]      PATIENT'S NAME: Alhaji Sanchez  PREFERRED NAME: Alhaji  PRONOUNS: He/his  MRN: [id number]  : 1995  ADDRESS: [address]   [place] [address]  ACCT. NUMBER:  [id number]  DATE OF SERVICE: 23  START TIME: 07:04 am  END TIME: 07:46 am  PREFERRED PHONE: [phone]  May we leave a program related message: Yes  SERVICE MODALITY:  Video Visit:      Provider verified identity through the following two step process.  Patient provided:  Patient was verified at admission/transfer    Telemedicine Visit: The patient's condition can be safely assessed and treated via synchronous audio and visual telemedicine encounter.      Reason for Telemedicine Visit: Services only offered telehealth    Originating Site (Patient Location): work parking lot in [place], [place]    Distant Site (Provider Location): Provider Remote Setting- Home Office    Consent:  The patient/guardian has verbally consented to: the potential risks and benefits of telemedicine (video visit) versus in person care; bill my insurance or make self-payment for services provided; and responsibility for payment of non-covered services.     Patient would like the video invitation sent by:  My Chart    Mode of Communication:  Video Conference via [employer] Assure    Distant Location (Provider):  Off-site    As the provider I attest to compliance with applicable laws and regulations related to telemedicine.    UNIVERSAL ADULT Mental Health DIAGNOSTIC ASSESSMENT    Identifying Information:  Patient is a 27 year old,   individual.  Patient was referred for an assessment by primary care provider.  Patient attended the session alone.    First appointment with patient in [facility] and was advised of the short-term, team based structure of the model including role of C and provider. Patient indicated understanding of the model and agreed to proceed with services as described. Care team has  "reviewed attendance agreement with patient. Patient advised that two failed appointments within 6 months may lead to termination of current episode of care.      Chief Complaint:   The reason for seeking services at this time is: \"Depression/anxiety/borderline personality disorder/adhd\".  The problem(s) began middle school.    Mental health issues started in 8th grade with an inpatient psychiatric admission due to threatening to stab principal and parents. Saw several therapists and psychologists as a child. Tried to avoid treatment. Remembers he was a \"very angry child.\" Tries to forget about his past but knows it has \"followed him.\" Times where he has put his head into the wall and \"rollercoaster\" of emotions. Bottling up his feelings and admitted to [facility] for 3 months in 2018. Ended up in and out of mental health hospitals 5 times from 2018 to 2019. Not happy with the medications and decided to go off all his medications in 2019 and has been without since then. Struggled with remembering to take his medications because there were six different ones at different times of the day. Felt like they weren't working.   Quick to anger. Overreacts and snaps at others, even his boss. Hygiene and motivation varies. Starting things and not getting anything done, at both work and home. Hard to stay on task. Easily distracted. Overthinking all the time. Knows he prefers to do things that feel good.  \"Chasing the dopamine rash.\"  Can get hyperfocused at times.    Dx: anxiety, depression and ADHD as a child. Borderline Personality Disorder in 2019. Patient admits that he does meet criteria for it but does not fully understand it.  [facility] provided psychoeducation.    SH: started when he was 9 years old. Used to cut on his arms. Stopped in high school. Has thought about it since then and did cut in 2019, needing stitches.  Patient reports he is more likely to get angry and hit his head on the wall now but it does not happen " often.  SI: Patient reports that he has had suicidal ideation for most of his life.  He reports the most significant episode happened in 2019.  He was parked on the side of the freeway and thought to end his life by walking into traffic.  Patient had every intention to do this when a friend called and interrupted his plans.  Patient reports that he continues to have suicidal thoughts but has not gotten to that point since then.  Patient denies he has an active plan or intent.  Patient indicates he does not actually want to end his life because of his new girlfriend and her two young children.  He is motivated to live.  Patient admits that he would not be upset if something did end his life, and feels ready to die.  Patient is aware of crisis resources but admits he would not use them.  [facility] provided education about EmPath and encouraged patient to reach out to any form of support if he does not feel safe.  Patient agreed. CSSRS completed.    Sleep: some nights able to fall asleep and then wakes up and not get back to sleep. Some nights of not getting to sleep and staying up late. Some nights of not sleeping at all. Takes naps during the day. Feels tired all the time, even with 8 hours of good sleep.  Appetite: eats one meal a day. Forgets to eat, not feel hungry.    Grandfather  in . Was his best friend. Bio father  when he was 5 months old. Grandfather became like a father.    Tx: tried in the past. Agrees it could be helpful at this time but hesitant due to poor experiences in the past. Trust issues. Possible [facility] services.    Patient has attempted to resolve these concerns in the past through Prior therapy, medications, inpatient psychiatric admissions.    Social/Family History:  Patient reported they grew up in [facility]  .  They were raised by biological mother; stepfather.  His biological father  when he was 5 months old and mother remarried. Parents were always together.  Step-father had  "three children and patient has a younger half sister. Patient reported that their childhood was \"did my own thing.\" Stepfather was around but not super involved. [place] things from his grandfather or learned on his own. Otherwise normal- denies abuse or neglect.  Patient described their current relationships with family of origin as: grandfather passed away in 2021. Tension in relationship with mother/stepfather.     The patient describes their cultural background as .  Cultural influences and impact on patient's life structure, values, norms, and healthcare: N/a.  Contextual influences on patient's health include: Contextual Factors: Individual Factors nothing identified.    These factors will be addressed in the Preliminary Treatment plan. Patient identified their preferred language to be English. Patient reported they does not need the assistance of an  or other support involved in therapy.     Patient reported had no significant delays in developmental tasks.   Patient's highest education level was high school graduate  .  Patient identified the following learning problems: attention, concentration and not able to learn material or sit still. Took longer to learn anything- told slight dyslexia because not able to take notes and keep up. Not able to study on his own. Grades were average. Got by until high school and then attendance dropped. Modifications will not be used to assist communication in therapy. Patient reports they are  able to understand written materials. Still takes a long time to read and write. Easily distracted.    Patient reported the following relationship history: started dating at age [age]  Patient's current relationship status is has a partner or significant other for a few months.  No major relationships in his past. Engaged at age 21 and stayed single a long time.  Patient identified their sexual orientation as heterosexual.  Patient reported having zero child(gerardo). " Patient identified partner; pets; friends as part of their support system.  Patient identified the quality of these relationships as good.     Patient's current living/housing situation involves staying with someone.  The immediate members of family and household include Neha Sanchez, Nicole,Mom and they report that housing is stable. Stays with his GF most of the time.    Patient is currently employed fulltime.  [employer] and looking for more long term career. Patient reports their finances are obtained through employment. Patient does identify finances as a current stressor.      Patient reported that they have not been involved with the legal system. Public intox at age 21. Patient does not report being under probation/ parole/ jurisdiction.    Patient's Strengths and Limitations:  Patient identified the following strengths or resources that will help them succeed in treatment: commitment to health and well being, friends / good social support, insight, intelligence, positive work environment, motivation and work ethic. Things that may interfere with the patient's success in treatment include: none identified.     Assessments:  The following assessments were completed by patient for this visit:  PHQ9:       3/16/2023     3:49 PM 4/6/2023     7:49 PM 5/13/2023     6:36 AM 5/17/2023     6:17 AM   PHQ-9 SCORE   PHQ-9 Total Score MyChart  13 (Moderate depression) 18 (Moderately severe depression) 17 (Moderately severe depression)   PHQ-9 Total Score 13 13 18 17    17     GAD7:       3/16/2023     3:49 PM 5/11/2023     6:28 PM   KADEN-7 SCORE   Total Score  16 (severe anxiety)   Total Score 13 16    16     CAGE-AID:       5/11/2023     6:30 PM   CAGE-AID Total Score   Total Score 0    0   Total Score MyChart 0 (A total score of 2 or greater is considered clinically significant)     PROMIS 10-Global Health (all questions and answers displayed):       5/11/2023     6:29 PM   PROMIS 10   In general, would you say your  health is: Good   In general, would you say your quality of life is: Good   In general, how would you rate your physical health? Good   In general, how would you rate your mental health, including your mood and your ability to think? Poor   In general, how would you rate your satisfaction with your social activities and relationships? Very good   In general, please rate how well you carry out your usual social activities and roles Very good   To what extent are you able to carry out your everyday physical activities such as walking, climbing stairs, carrying groceries, or moving a chair? Completely   In the past 7 days, how often have you been bothered by emotional problems such as feeling anxious, depressed, or irritable? Always   In the past 7 days, how would you rate your fatigue on average? Moderate   In the past 7 days, how would you rate your pain on average, where 0 means no pain, and 10 means worst imaginable pain? 0   In general, would you say your health is: 3    3   In general, would you say your quality of life is: 3    3   In general, how would you rate your physical health? 3    3   In general, how would you rate your mental health, including your mood and your ability to think? 1    1   In general, how would you rate your satisfaction with your social activities and relationships? 4    4   In general, please rate how well you carry out your usual social activities and roles. (This includes activities at home, at work and in your community, and responsibilities as a parent, child, spouse, employee, friend, etc.) 4    4   To what extent are you able to carry out your everyday physical activities such as walking, climbing stairs, carrying groceries, or moving a chair? 5    5   In the past 7 days, how often have you been bothered by emotional problems such as feeling anxious, depressed, or irritable? 5    5   In the past 7 days, how would you rate your fatigue on average? 3    3   In the past 7 days, how  would you rate your pain on average, where 0 means no pain, and 10 means worst imaginable pain? 0    0   Global Mental Health Score 9    9   Global Physical Health Score 16    16   PROMIS TOTAL - SUBSCORES 25    25     [place] Suicide Severity Rating Scale (Lifetime/Recent)      5/18/2023     7:59 AM   [place] Suicide Severity Rating (Lifetime/Recent)   1. Wish to be Dead (Lifetime) Y   Wish to be Dead Description (Lifetime) Patient reports he has been having thoughts of wishing his life could be over since middle school.   1. Wish to be Dead (Past 1 Month) Y   Wish to be Dead Description (Past 1 Month) Patient continues to have thoughts of wishing his life could be over.   2. Non-Specific Active Suicidal Thoughts (Lifetime) Y   Non-Specific Active Suicidal Thought Description (Lifetime) Patient reports that he has had suicidal ideation on and off since middle school.   2. Non-Specific Active Suicidal Thoughts (Past 1 Month) Y   Non-Specific Active Suicidal Thought Description (Past 1 Month) Patient reports he continues to have thoughts about killing himself but denies he has an active plan or intent   3. Active Suicidal Ideation with any Methods (Not Plan) Without Intent to Act (Lifetime) N   3. Active Suicidal Ideation with any Methods (Not Plan) Without Intent to Act (Past 1 Month) N   4. Active Suicidal Ideation with Some Intent to Act, Without Specific Plan (Lifetime) N   4. Active Suicidal Ideation with Some Intent to Act, Without Specific Plan (Past 1 Month) N   5. Active Suicidal Ideation with Specific Plan and Intent (Lifetime) Y   Active Suicidal Ideation with Specific Plan and Intent Description (Lifetime) Patient reports he had an active plan and intent in 2019.  He did not follow through.   5. Active Suicidal Ideation with Specific Plan and Intent (Past 1 Month) N   Actual Attempt (Lifetime) N   Actual Attempt (Past 3 Months) N   Has subject engaged in non-suicidal self-injurious behavior? (Lifetime)  Y   Has subject engaged in non-suicidal self-injurious behavior? (Past 3 Months) Y   Interrupted Attempts (Lifetime) Y   Total Number of Interrupted Attempts (Lifetime) 1   Interrupted Attempt Description (Lifetime) Patient had a plan to walk into traffic in 2019 but was stopped when a friend called.   Interrupted Attempts (Past 3 Months) N   Aborted or Self-Interrupted Attempt (Lifetime) N   Preparatory Acts or Behavior (Lifetime) N   Calculated C-SSRS Risk Score (Lifetime/Recent) Moderate Risk       Personal and Family Medical History:  Patient does report a family history of mental health concerns.  Patient reports family history includes Anxiety Disorder in his mother; Asthma in his sister; Depression in his mother; Thyroid Disease in his maternal grandmother.     Patient reports the maternal side of the family has struggled with depression.    Patient does report Mental Health Diagnosis and/or Treatment.  Patient Patient reported the following previous diagnoses which include(s): ADHD, an Anxiety Disorder, Depression and a Personality Disorder .  Patient reported symptoms began in middle school.   Patient has received mental health services in the past: therapy with Various therapy providers in middle school and high school, and interaction with providers through [facility] until 2019 and psychiatry with Psychiatry through [facility]. .  Psychiatric Hospitalizations: [facility] Patient reports 5 inpatient hospitalizations from [id number], primarily at [facility].  Patient denies a history of civil commitment.  Patient is not receiving other mental health services.  These include none.         Patient has had a physical exam to rule out medical causes for current symptoms.  Date of last physical exam was within the past year. Client was encouraged to follow up with PCP if symptoms were to develop. The patient has a [place] Primary Care Provider, who is named Gerry Vuong..  Patient reports no current medical  and/or dental concerns.  Patient denies any issues with pain..   There are not significant appetite / nutritional concerns / weight changes.   Patient does report a history of head injury / trauma / cognitive impairment.  Prior head injury from multiple years ago.    Patient reports current meds as:   No outpatient medications have been marked as taking for the 5/18/23 encounter (Virtual Visit) with Tracie Payne LICSW.   See EMR    Medication Adherence:  Patient reports taking.  taking prescribed medications as prescribed.    Patient Allergies:    Allergies   Allergen Reactions     Lurasidone Other (See Comments)     Mold      Other reaction(s): Runny Nose     Neomycin Unknown     burning in ears- ear drops       Medical History:    Past Medical History:   Diagnosis Date     Depressive disorder     Katelyn been dealing with depression  and anxiety.and adhd sience i was 8 or 9. I was also diagnosed  with borderline  personality disorder back in 2018         Current Mental Status Exam:   Appearance:  Appropriate    Eye Contact:  Good   Psychomotor:  Normal       Gait / station:  no problem  Attitude / Demeanor: Cooperative  Interested  Speech      Rate / Production: Normal/ Responsive      Volume:  Normal  volume      Language:  intact  Mood:   Angry  Anxious  Depressed  Irritable   Affect:   Labile    Thought Content: Clear   Thought Process: Coherent  Goal Directed       Associations: No loosening of associations  Insight:   Good   Judgment:  Intact   Orientation:  All  Attention/concentration: Good      Substance Use:  Patient did not report a family history of substance use concerns; see medical history section for details.  Patient has not received chemical dependency treatment in the past.  Patient has not ever been to detox.      Patient is not currently receiving any chemical dependency treatment.           Substance History of use Age of first use Date of last use     Pattern and duration of use (include  amounts and frequency)   Alcohol currently use   18 05/07/23 REPORTS SUBSTANCE USE: reports using substance 1 times per day and has 2 beers (4-5 on weekends) at a time.   Patient reports heaviest use was last summer- day drinking. was sober 7 months and not notice any difference in his mental health.   Cannabis   used in the past 15 05/11/18 REPORTS SUBSTANCE USE: reports using substance 2 times per year and has 1 edible at a time.   Patient reports heaviest use is current use.     Amphetamines   used in the past   07/11/20 REPORTS SUBSTANCE USE: N/A   Cocaine/crack    never used       REPORTS SUBSTANCE USE: N/A   Hallucinogens used in the past   27 08/11/22  REPORTS SUBSTANCE USE: N/A   Inhalants never used         REPORTS SUBSTANCE USE: N/A   Heroin never used         REPORTS SUBSTANCE USE: N/A   Other Opiates never used     REPORTS SUBSTANCE USE: N/A   Benzodiazepine   used in the past 25 02/11/19 REPORTS SUBSTANCE USE: N/A   Barbiturates never used     REPORTS SUBSTANCE USE: N/A   Over the counter meds never used     REPORTS SUBSTANCE USE: N/A   Caffeine currently use 12   REPORTS SUBSTANCE USE: reports using substance 6 times per day and has 1 coffee or Mtn Dews at a time.   Patient reports heaviest use is current use.   Nicotine  currently use 18 05/11/23 REPORTS SUBSTANCE USE: reports using substance 10 times per day and has 1 cig or e-cig at a time.   Patient reports heaviest use is current use.   Other substances not listed above:  Identify:  never used     REPORTS SUBSTANCE USE: N/A     Patient reported the following problems as a result of their substance use: no problems, not applicable.    Substance Use: No symptoms    Based on the negative CAGE score and clinical interview there  are not indications of drug or alcohol abuse.      Significant Losses / Trauma / Abuse / Neglect Issues:   Patient did not serve in the .  There are indications or report of significant loss, trauma, abuse or neglect  issues related to: death of His father when he was young and his grandfather in 2021.  Concerns for possible neglect are not present.    Safety Assessment:   Patient denies current homicidal ideation and behaviors.  Patient denies current self-injurious ideation and behaviors.    Patient denied risk behaviors associated with substance use.  Patient denies any high risk behaviors associated with mental health symptoms.  Patient reports the following current concerns for their personal safety: None.  Patient reports there are firearms in the house.     yes, they are secured. The firearms are secured in a locked space.    History of Safety Concerns:  Patient reported a history of homicidal ideation.  Onset: Middle school and frequency: Brief.  Client reported a history of homicidal behaviors:  Patient reports he had thoughts to stab his principal and parents when he was in eighth grade.  Interventions: He was hospitalized as a result     Patient denied a history of personal safety concerns.    Patient denied a history of assaultive behaviors.    Patient denied a history of sexual assault behaviors.     Patient denied a history of risk behaviors associated with substance use.  Patient denies any history of high risk behaviors associated with mental health symptoms.  Patient reports the following protective factors: dedication to family or friends; purpose; secure attachment    Risk Plan:  See Recommendations for Safety and Risk Management Plan    Review of Symptoms per patient report:   Depression: Change in sleep, Lack of interest, Excessive or inappropriate guilt, Change in energy level, Difficulties concentrating, Change in appetite, Psychomotor slowing or agitation, Suicidal ideation, Feelings of hopelessness, Feelings of helplessness, Low self-worth, Ruminations, Irritability, Feeling sad, down, or depressed, Withdrawn, Poor hygeine and Anger outbursts  Christi:  Irritability, Racing thoughts, Increased activity,  Decreased need for sleep, Restlessness and Impulsiveness not dx: Bipolar  Psychosis: No Symptoms  Anxiety: Excessive worry, Nervousness, Physical complaints, such as headaches, stomachaches, muscle tension, Sleep disturbance, Ruminations, Poor concentration and Irritability chronic migraines, overthinking all the time  Panic:  Shortness of breath and clammy not often, not feel like full blown panic  Post Traumatic Stress Disorder:  No Symptoms   Eating Disorder: No Symptoms  ADD / ADHD:  Inattentive, Poor task completion, Poor organizational skills, Distractibility, Forgetful, Impulsive, Restlessness/fidgety and Hyperactive  Conduct Disorder: No symptoms  Autism Spectrum Disorder: No symptoms  Obsessive Compulsive Disorder: No Symptoms    Patient reports the following compulsive behaviors and treatment history: None.      Diagnostic Criteria:   Generalized Anxiety Disorder  A. Excessive anxiety and worry about a number of events or activities (such as work or school performance).   B. The person finds it difficult to control the worry.  C. Select 3 or more symptoms (required for diagnosis). Only one item is required in children.   - Restlessness or feeling keyed up or on edge.    - Being easily fatigued.    - Difficulty concentrating or mind going blank.    - Irritability.    - Muscle tension.    - Sleep disturbance (difficulty falling or staying asleep, or restless unsatisfying sleep).   D. The focus of the anxiety and worry is not confined to features of an Axis I disorder.  E. The anxiety, worry, or physical symptoms cause clinically significant distress or impairment in social, occupational, or other important areas of functioning.   F. The disturbance is not due to the direct physiological effects of a substance (e.g., a drug of abuse, a medication) or a general medical condition (e.g., hyperthyroidism) and does not occur exclusively during a Mood Disorder, a Psychotic Disorder, or a Pervasive Developmental  Disorder.    - The aformentioned symptoms began Several year(s) ago and occurs 7 days per week and is experienced as moderate. Major Depressive Disorder  CRITERIA (A-C) REPRESENT A MAJOR DEPRESSIVE EPISODE - SELECT THESE CRITERIA  A) Recurrent episode(s) - symptoms have been present during the same 2-week period and represent a change from previous functioning 5 or more symptoms (required for diagnosis)   - Depressed mood. Note: In children and adolescents, can be irritable mood.     - Diminished interest or pleasure in all, or almost all, activities.    - Decreased sleep.    - Psychomotor activity agitation.    - Fatigue or loss of energy.    - Feelings of worthlessness or inappropriate guilt.    - Diminished ability to think or concentrate, or indecisiveness.    - Recurrent thoughts of death (not just fear of dying), recurrent suicidal ideation without a specific plan, or a suicide attempt or a specific plan for committing suicide.   B) The symptoms cause clinically significant distress or impairment in social, occupational, or other important areas of functioning  C) The episode is not attributable to the physiological effects of a substance or to another medical condition  D) The occurence of major depressive episode is not better explained by other thought / psychotic disorders  E) There has never been a manic episode or hypomanic episode    Functional Status:  Patient reports the following functional impairments:  self-care, social interactions and work / vocational responsibilities.     Nonprogrammatic care:  Patient is requesting basic services to address current mental health concerns.    Clinical Summary:  1. Reason for assessment: Medication stabilization.  2. Psychosocial, Cultural and Contextual Factors: Patient lives with his parents but has been staying more with his girlfriend.  He is working full-time.  He has limited supports.  3. Principal DSM5 Diagnoses  (Sustained by DSM5 Criteria Listed Above):    296.32 (F33.1) Major Depressive Disorder, Recurrent Episode, Moderate _  300.02 (F41.1) Generalized Anxiety Disorder.  4. Other Diagnoses that is relevant to services: Not applicable.  5. Provisional Diagnosis:  N/A  6. Prognosis: Relieve Acute Symptoms.  7. Likely consequences of symptoms if not treated: Not applicable.  8. Client strengths include:  employed, goal-focused, good listener, insightful, intelligent, motivated, open to learning, open to suggestions / feedback, support of family, friends and providers, wants to learn, willing to ask questions, willing to relate to others and work history .     Recommendations:     1. Plan for Safety and Risk Management:   Safety and Risk: Recommended that patient call 911 or go to the local ED should there be a change in any of these risk factors..          Report to child / adult protection services was NA.     2. Patient's identified mental health concerns with a cultural influence will be addressed by Nothing identified at this time.     3. Initial Treatment will focus on:    Depressed Mood - Patient will report improvement in his overall mood and ability to get things done  Anxiety - Patient will report a decrease in his acute anxiety and irritability.     4. Resources/Service Plan:    services are not indicated.   Modifications to assist communication are not indicated.   Additional disability accommodations are not indicated.      5. Collaboration:   Collaboration / coordination of treatment will be initiated with the following  support professionals: primary care physician and psychiatry.      6.  Referrals:   The following referral(s) will be initiated: [facility] services. Next Scheduled Appointment: As needed.      A Release of Information has been obtained for the following: Not applicable.     Emergency Contact was not obtained.      Clinical Substantiation/medical necessity for the above recommendations: Patient would benefit from a brief psychiatric  intervention and a referral for [facility] services to ascertain readiness for therapy.    7. HOLLY:    HOLLY:  Discussed the general effects of drugs and alcohol on health and well-being. Provider gave patient printed information about the  effects of chemical use on their health and well being. Recommendations: Patient was encouraged to cut down on his current alcohol use but declines the need for substance abuse services at this time.     8. Records:   These were reviewed at time of assessment.   Information in this assessment was obtained from the medical record and  provided by patient who is a good historian.    Patient will have open access to their mental health medical record.    9.   Interactive Complexity: No      Provider Name/ Credentials:  Lillie Heath  May 18, 2023        Answers for HPI/ROS submitted by the patient on 5/17/2023  If you checked off any problems, how difficult have these problems made it for you to do your work, take care of things at home, or get along with other people?: Somewhat difficult  PHQ9 TOTAL SCORE: 17  KADEN 7 TOTAL SCORE: 16

## 2023-05-18 NOTE — TELEPHONE ENCOUNTER
Writer try to schedule a follow up with patient through Tracie visit. Patient was in and out of the appointment and did not hear him. Writer attempt 3x for phone call but the call did not go through. Sent [facility] message for patient to call [phone] to schedule follow up.

## 2023-05-18 NOTE — PATIENT INSTRUCTIONS
Jeancarlos Mane,    Thank you for our time together today in Collaborative Care Psychiatry Service (CCPS). CCPS provides brief psychiatric medication stabilization to patients referred by their Primary Care Providers. Patients are typically seen in CCPS for up to 4 appointments and then referred back to the PCP for ongoing refills unless longer term medication management by a specialist is indicated. If I believe you will benefit from long-term psychiatric care I will discuss this with you. If you are interested in seeing a psychiatrist or psychiatric nurse practitioner long-term, please send me a message in [facility] so we can refer you appropriately.     Treatment Plan:  -increase CITALOPRAM to 30mg at bedtime  -start CONCERTA 18mg daily in the morning  Start slowly reducing your nicotine and caffeine - don't stop cold turkey because it will cause headaches and irritability.  Follow up in 3-4 weeks or sooner if needed.  You can call [phone] to make appointments, leave a message with our nursing team, and inquire about any mental health referrals I have placed.  Please call your pharmacy to request a refill of your medicines listed above if needed between appointments.     GERTRUDE Larsen  [facility]abor[facility] Psychiatry Service  [facility]   Patient Education   [facility] Care Psychiatry Service  What to Expect  Here's what to expect from your Collaborative Care Psychiatry Service (CCPS).   About CCPS  CCPS means 2 people work together to help you get better. You'll meet with a behavioral health clinician and a psychiatric doctor. A behavioral health clinician helps people with mental health problems by talking with them. A psychiatric doctor helps people by giving them medicine.  How it works  At every visit, you'll see the behavioral health clinician (BHC) first. They'll talk with you about how you're doing and teach you how to feel better.   Then you'll see the psychiatric doctor. This doctor can help  "you deal with troubling thoughts and feelings by giving you medicine. They'll make sure you know the plan for your care.   CCPS usually takes 3 to 6 visits. If you need more visits, we may have you start seeing a different psychiatric doctor for ongoing care.  If you have any questions or concerns, we'll be glad to talk with you.  About visits  Be open  At your visits, please talk openly about your problems. It may feel hard, but it's the best way for us to help you.  Cancelling visits  If you can't come to your visit, please call us right away at [phone]. If you don't cancel at least 24 hours (1 full day) before your visit, that's \"late cancellation.\"  Being late to visits  Being very late is the same as not showing up. You will be a \"no show\" if:  Your appointment starts with a [facility], and you're more than 15 minutes late for a 30-minute (half hour) visit. This will also cancel your appointment with the psychiatric doctor.  Your appointment is with a psychiatric doctor only, and you're more than 15 minutes late for a 30-minute (half hour) visit.  Your appointment is with a psychiatric doctor only, and you're more than 30 minutes late for a 60-minute (full hour) visit.  If you cancel late or don't show up 2 times within 6 months, we may end your care.   Getting help between visits  If you need help between visits, you can call us Monday to Friday from 8 a.m. to 4:30 p.m. at [phone].  Emergency care  Call 911 or go to the nearest emergency department if your life or someone else's life is in danger.  Call 988 anytime to reach the national Suicide and Crisis hotline.  Medicine refills  To refill your medicine, call your pharmacy. You can also call [facility]'s Behavioral Access at [phone], Monday to Friday, 8 a.m. to 4:30 p.m. It can take 1 to 3 business days to get a refill.   Forms, letters, and tests  You may have papers to fill out, like FMLA, short-term disability, and workability. We " can help you with these forms at your visits, but you must have an appointment. You may need more than 1 visit for this, to be in an intensive therapy program, or both.  Before we can give you medicine for ADHD, we may refer you to get tested for it or confirm it another way.  We may not be able to give you an emotional support animal letter.  We don't do mental health checks ordered by the court.   We don't do mental health testing, but we can refer you to get tested.   Thank you for choosing us for your care.  For informational purposes only. Not to replace the advice of your health care provider. Copyright    [facility]. All rights reserved. mGaadi [id number] - .           The Benefits of Living Tobacco-Free  What do you want to improve in your life by quitting tobacco? Whether you smoke cigarettes, e-cigarettes, cigars, or a pipe, or use smokeless tobacco, there are many reasons to quit. Check off some reasons you want to be tobacco-free.  Health benefits  ___  I want to breathe without coughing or feeling short of breath.  ___  I want to reduce my risk for lung cancer, oral cancer, heart disease, bone problems such as osteoporosis and fractures, and chronic lung disease. Or reduce my risk for a serious lung injury called EVALI that may happen from vaping or using e-cigarettes.  ___  I want to have fewer wrinkles and softer skin.  ___  I want to improve my sense of taste and smell.  ___  For pregnant women: I want to reduce my risk for a miscarriage, stillbirth,  birth, or a low-birth-weight baby.  Personal benefits  ___  I want to be able to walk, go up stairs, and exercise without becoming out of breath.  ___  I want to feel more in control of my life.  ___  I want to have better-smelling hair, clothes, home, and car.  ___  I want to save time by not having to take smoke breaks, buy tobacco products, or hunt for a light.  ___  I want to have whiter teeth and fresher  breath.  Family benefits  ___  I want to reduce my child's respiratory tract infections.  ___  I want to set a healthy example for my child.  ___  I want to have the energy needed to play with my children and not become out-of-breath  ___  I want to reduce my family s cancer risk.  Financial benefits  ___  I want to save hundreds of dollars each year that would be spent on tobacco products.  ___  I want to save money on medical bills.  ___  I want to save money on life, health, and car insurance premiums.  How much do you spend?  A tobacco habit is expensive. Do you know how much you spend on tobacco each year? Fill in the blanks below to find out:  A. How much do you pay for 1 pack of cigarettes, 1 cigar, 1 pouch of pipe tobacco, or 1 can of smokeless tobacco? $________  B. How many packs, cigars, pouches, or cans do you use each day? _______________  C. Multiply answer A with answer B:___________________  D. Multiply answer C with 365: $___________________. This is your yearly cost of using tobacco.  Besides the cost of buying tobacco, there are other costs. These include:  Costs of cleaning clothing, furniture, and car  Replacement costs for clothing and furniture  Medical costs for tobacco-related illnesses  Missed days of work because of illness  Higher health, life, and car insurance premiums  Get help  QuitNow, www.cdc.gov/tobacco/quit_smoking/, 800-QUIT-NOW (444-502-1263).  QuitLine, www.smokefree.gov, 240-20U-QUIT (392-758-5536).    [employer] last reviewed this educational content on 4/1/2020 2000-2022 [employer]. All rights reserved. This information is not intended as a substitute for professional medical care. Always follow your healthcare professional's instructions.          Planning to Quit Smoking  Your healthcare provider may have told you that you need to give up tobacco. Only you can decide if and when you're ready to quit. Quitting is hard to do. But the benefits will be worth  it. When you decide to quit, come up with a plan that s right for you. Discuss your plan with your provider. And talk with them about medicines to help you quit.     Line up support  To quit smoking, you ll need a plan and some help. Pick a date in the next 2 to 4 weeks to quit. Use the time between now and that date to arrange for support.   Classes and counselors. Quit-smoking classes  people like you through the process. Get to know others in a class. And support each other beyond the class. Phone counseling also helps you keep on track. Ask your healthcare provider, local hospital, or public health department to put you in touch with a class and a phone counselor.  Family and friends. Tell your family and friends about your quit date. Ask them to support your change. If they smoke, only see them in smoke-free places. Don't allow smoking in your home and car.  Be careful with these products  Finding something to replace cigarettes may be hard to do. Some things may be as harmful as cigarettes. These include:   Smokeless (chewing) tobacco. This is just as harmful as regular tobacco. Don't use tobacco as a substitute for cigarettes.  Herbal medicines or teas. These may affect how your body handles nicotine. Talk with your healthcare provider before using these products.  E-cigarettes. E-cigarettes aren't approved by the FDA as a quit-smoking aid. So far, the research shows limited evidence that e-cigarettes are effective at helping smokers quit. They may also have substances that can cause cancer or life-threatening lung conditions. Experts advise not to use these products.  Quit-smoking products  Many products can help you quit smoking. Some are prescription medicines that help curb your cravings and withdrawal symptoms. Other products slowly lessen the level of nicotine your body absorbs. Nicotine is the highly addictive substance found in cigarettes, cigars, and chewing tobacco. Nicotine replacement  products can help get your body used to slowly decreasing amounts of nicotine after you quit smoking. These products include a nicotine patch, gum, lozenge, nasal spray, and inhaler. Always follow the directions for your medicine or product carefully. Your healthcare provider may tell you to start taking the prescription medicine 1 week before you plan to quit. Don't smoke while you use nicotine products. Doing so can harm your health.   To learn more  For more helpful tips and resources, visit:  National [employer] [place]'s smoke-free programs at www.smokefree.gov  877-44U-QUIT (951-860-2754)  CDC at www.cdc.gov/tobacco/quit_smoking/  BeTobaccoFree.gov 800-QUIT-NOW (624-970-3297)  American Lung Association at www.lung.org/stop-smoking/  800-LUNGUSA (942-583-9370, press 2)  [employer] last reviewed this educational content on 1/1/2022 2000-2022 [employer]. All rights reserved. This information is not intended as a substitute for professional medical care. Always follow your healthcare professional's instructions.

## 2023-05-19 ENCOUNTER — OFFICE VISIT (OUTPATIENT)
Dept: FAMILY MEDICINE | Facility: CLINIC | Age: 28
End: 2023-05-19
Payer: COMMERCIAL

## 2023-05-19 ENCOUNTER — MYC MEDICAL ADVICE (OUTPATIENT)
Dept: FAMILY MEDICINE | Facility: CLINIC | Age: 28
End: 2023-05-19

## 2023-05-19 VITALS
HEIGHT: 69 IN | SYSTOLIC BLOOD PRESSURE: 123 MMHG | BODY MASS INDEX: 23.7 KG/M2 | HEART RATE: 75 BPM | OXYGEN SATURATION: 100 % | TEMPERATURE: 98.1 F | RESPIRATION RATE: 16 BRPM | WEIGHT: 160 LBS | DIASTOLIC BLOOD PRESSURE: 75 MMHG

## 2023-05-19 DIAGNOSIS — F90.9 ATTENTION DEFICIT HYPERACTIVITY DISORDER (ADHD), UNSPECIFIED ADHD TYPE: ICD-10-CM

## 2023-05-19 DIAGNOSIS — J32.0 CHRONIC MAXILLARY SINUSITIS: Primary | ICD-10-CM

## 2023-05-19 DIAGNOSIS — R06.83 SNORING: ICD-10-CM

## 2023-05-19 PROCEDURE — 99213 OFFICE O/P EST LOW 20 MIN: CPT | Performed by: PHYSICIAN ASSISTANT

## 2023-05-19 RX ORDER — METHYLPHENIDATE HYDROCHLORIDE 18 MG/1
18 TABLET ORAL
Refills: 0 | COMMUNITY
Start: 2023-05-19 | End: 2023-05-19

## 2023-05-19 RX ORDER — METHYLPHENIDATE HYDROCHLORIDE 18 MG/1
18 TABLET ORAL EVERY MORNING
Qty: 30 TABLET | Refills: 0 | Status: SHIPPED | OUTPATIENT
Start: 2023-05-19 | End: 2023-06-09

## 2023-05-19 RX ORDER — METHYLPHENIDATE HYDROCHLORIDE 18 MG/1
18 TABLET ORAL EVERY MORNING
Qty: 30 TABLET | Refills: 0 | Status: SHIPPED | OUTPATIENT
Start: 2023-05-19 | End: 2023-05-19

## 2023-05-19 ASSESSMENT — ENCOUNTER SYMPTOMS
ABDOMINAL PAIN: 0
COUGH: 0
FREQUENCY: 0
DYSURIA: 0
DIARRHEA: 0
SHORTNESS OF BREATH: 0
HEADACHES: 1
ARTHRALGIAS: 0
SORE THROAT: 0
JOINT SWELLING: 0
FEVER: 0
WEAKNESS: 0
PALPITATIONS: 0
HEARTBURN: 0
CHILLS: 0
HEMATURIA: 0
EYE PAIN: 0
CONSTIPATION: 0
NAUSEA: 0
PARESTHESIAS: 0
DIZZINESS: 0
HEMATOCHEZIA: 0
MYALGIAS: 0
NERVOUS/ANXIOUS: 1

## 2023-05-19 ASSESSMENT — PAIN SCALES - GENERAL: PAINLEVEL: NO PAIN (0)

## 2023-05-19 NOTE — TELEPHONE ENCOUNTER
To provider: Gerry has sent in a refill for patient medication methylphenidate HCl ER (CONCERTA) 18 MG CR tablet.     Please disregard earlier message. Apologies.    Edyta Bains RN

## 2023-05-19 NOTE — PROGRESS NOTES
SUBJECTIVE:   CC: Alhaji is an 27 year old who presents for preventative health visit.       5/19/2023     7:00 AM   Additional Questions   Roomed by ANANDA Goetz CMA     Patient has been advised of split billing requirements and indicates understanding: Yes     1. Chronic maxillary sinusitis  Chronic since broken nose snowboarding  - Adult ENT  Referral; Future  Has congestion in nose always and takes daily allergy medication    2. Snoring  Informed by the girlfriend  Does not describe typical pattern of sleep apnea with snoring and choking/apneic episodes  Improved with breathing strips      Healthy Habits:     Getting at least 3 servings of Calcium per day:  NO    Bi-annual eye exam:  NO    Dental care twice a year:  Yes    Sleep apnea or symptoms of sleep apnea:  Excessive snoring    Diet:  Regular (no restrictions)    Frequency of exercise:  1 day/week    Duration of exercise:  30-45 minutes    Taking medications regularly:  Not Applicable    Medication side effects:  None    PHQ-2 Total Score: 3    Additional concerns today:  No                    Social History     Tobacco Use     Smoking status: Every Day     Types: Dip, chew, snus or snuff, Other     Passive exposure: Never     Smokeless tobacco: Current     Types: Chew   Vaping Use     Vaping status: Every Day     Substances: Nicotine, Flavoring     Devices: Refillable tank   Substance Use Topics     Alcohol use: Yes             5/13/2023     6:36 AM   Alcohol Use   Prescreen: >3 drinks/day or >7 drinks/week? Yes   AUDIT SCORE  10       Last PSA: No results found for: PSA    Reviewed orders with patient. Reviewed health maintenance and updated orders accordingly - Yes      Reviewed and updated as needed this visit by clinical staff   Tobacco  Allergies  Meds              Reviewed and updated as needed this visit by Provider                 Past Medical History:   Diagnosis Date     Depressive disorder     Katelyn been dealing with depression  and  "anxiety.and adhd sience i was 8 or 9. I was also diagnosed  with borderline  personality disorder back in 2018      History reviewed. No pertinent surgical history.    Review of Systems   Constitutional: Negative for chills and fever.   HENT: Positive for congestion. Negative for ear pain, hearing loss and sore throat.    Eyes: Negative for pain and visual disturbance.   Respiratory: Negative for cough and shortness of breath.    Cardiovascular: Negative for chest pain, palpitations and peripheral edema.   Gastrointestinal: Negative for abdominal pain, constipation, diarrhea, heartburn, hematochezia and nausea.   Genitourinary: Negative for dysuria, frequency, genital sores, hematuria, impotence, penile discharge and urgency.   Musculoskeletal: Negative for arthralgias, joint swelling and myalgias.   Skin: Negative for rash.   Neurological: Positive for headaches. Negative for dizziness, weakness and paresthesias.   Psychiatric/Behavioral: Positive for mood changes. The patient is nervous/anxious.          OBJECTIVE:   /75   Pulse 75   Temp 98.1  F (36.7  C) (Oral)   Resp 16   Ht 1.74 m (5' 8.5\")   Wt 72.6 kg (160 lb)   SpO2 100%   BMI 23.97 kg/m      Physical Exam  GENERAL: healthy, alert and no distress  EYES: Eyes grossly normal to inspection, PERRL and conjunctivae and sclerae normal  HENT: ear canals and TM's normal, nose and mouth without ulcers or lesions  NECK: no adenopathy, no asymmetry, masses, or scars and thyroid normal to palpation  RESP: lungs clear to auscultation - no rales, rhonchi or wheezes  CV: regular rate and rhythm, normal S1 S2, no S3 or S4, no murmur, click or rub, no peripheral edema and peripheral pulses strong  MS: no gross musculoskeletal defects noted, no edema          COUNSELING:   Reviewed preventive health counseling, as reflected in patient instructions  Special attention given to: sleeping        He reports that he has been smoking dip, chew, snus or snuff and other. " He has never been exposed to tobacco smoke. His smokeless tobacco use includes chew.  Nicotine/Tobacco Cessation Plan:   Self help information given to patient            DENISHA ABURTO PA-C  [facility]  Answers for HPI/ROS submitted by the patient on 5/13/2023  If you checked off any problems, how difficult have these problems made it for you to do your work, take care of things at home, or get along with other people?: Somewhat difficult  PHQ9 TOTAL SCORE: 18

## 2023-05-19 NOTE — TELEPHONE ENCOUNTER
Called patient and discussed plan of care and explained the med has been sent over to original prescribing provider.    Patient will call facility if questions or concerns.    Edyta Bains RN

## 2023-05-19 NOTE — TELEPHONE ENCOUNTER
Patient has sent a [employer] message notifying staff medication was discontinued from medication list, therefore canceling methylphenidate HCl ER (CONCERTA) 18 MG CR tablet at pharmacy. Patient was seen in office today and medication was discontinued.    Provider: are you able to reorder medication and send over medication to the pharmacy?    Med and pharmacy pended below.    Edyta Bains RN

## 2023-06-09 ENCOUNTER — VIRTUAL VISIT (OUTPATIENT)
Dept: PSYCHIATRY | Facility: CLINIC | Age: 28
End: 2023-06-09
Payer: COMMERCIAL

## 2023-06-09 ENCOUNTER — VIRTUAL VISIT (OUTPATIENT)
Dept: BEHAVIORAL HEALTH | Facility: CLINIC | Age: 28
End: 2023-06-09
Payer: COMMERCIAL

## 2023-06-09 DIAGNOSIS — F90.9 ATTENTION DEFICIT HYPERACTIVITY DISORDER (ADHD), UNSPECIFIED ADHD TYPE: Primary | ICD-10-CM

## 2023-06-09 DIAGNOSIS — F33.1 MODERATE EPISODE OF RECURRENT MAJOR DEPRESSIVE DISORDER (H): ICD-10-CM

## 2023-06-09 DIAGNOSIS — F41.1 GAD (GENERALIZED ANXIETY DISORDER): ICD-10-CM

## 2023-06-09 DIAGNOSIS — F41.1 GENERALIZED ANXIETY DISORDER: Primary | ICD-10-CM

## 2023-06-09 PROCEDURE — 90832 PSYTX W PT 30 MINUTES: CPT | Mod: VID | Performed by: SOCIAL WORKER

## 2023-06-09 PROCEDURE — 99214 OFFICE O/P EST MOD 30 MIN: CPT | Mod: VID | Performed by: STUDENT IN AN ORGANIZED HEALTH CARE EDUCATION/TRAINING PROGRAM

## 2023-06-09 RX ORDER — METHYLPHENIDATE HYDROCHLORIDE 36 MG/1
36 TABLET ORAL EVERY MORNING
Qty: 30 TABLET | Refills: 0 | Status: SHIPPED | OUTPATIENT
Start: 2023-06-20 | End: 2023-10-31

## 2023-06-09 RX ORDER — CITALOPRAM HYDROBROMIDE 40 MG/1
40 TABLET ORAL DAILY
Qty: 30 TABLET | Refills: 1 | Status: SHIPPED | OUTPATIENT
Start: 2023-06-09 | End: 2023-07-14

## 2023-06-09 RX ORDER — CLONIDINE HYDROCHLORIDE 0.1 MG/1
TABLET ORAL
Qty: 74 TABLET | Refills: 0 | Status: SHIPPED | OUTPATIENT
Start: 2023-06-09 | End: 2023-07-14

## 2023-06-09 ASSESSMENT — PATIENT HEALTH QUESTIONNAIRE - PHQ9
SUM OF ALL RESPONSES TO PHQ QUESTIONS 1-9: 17
SUM OF ALL RESPONSES TO PHQ QUESTIONS 1-9: 17
10. IF YOU CHECKED OFF ANY PROBLEMS, HOW DIFFICULT HAVE THESE PROBLEMS MADE IT FOR YOU TO DO YOUR WORK, TAKE CARE OF THINGS AT HOME, OR GET ALONG WITH OTHER PEOPLE: SOMEWHAT DIFFICULT
SUM OF ALL RESPONSES TO PHQ QUESTIONS 1-9: 17
SUM OF ALL RESPONSES TO PHQ QUESTIONS 1-9: 17
10. IF YOU CHECKED OFF ANY PROBLEMS, HOW DIFFICULT HAVE THESE PROBLEMS MADE IT FOR YOU TO DO YOUR WORK, TAKE CARE OF THINGS AT HOME, OR GET ALONG WITH OTHER PEOPLE: SOMEWHAT DIFFICULT

## 2023-06-09 ASSESSMENT — ANXIETY QUESTIONNAIRES
6. BECOMING EASILY ANNOYED OR IRRITABLE: NEARLY EVERY DAY
5. BEING SO RESTLESS THAT IT IS HARD TO SIT STILL: NEARLY EVERY DAY
6. BECOMING EASILY ANNOYED OR IRRITABLE: NEARLY EVERY DAY
2. NOT BEING ABLE TO STOP OR CONTROL WORRYING: NEARLY EVERY DAY
GAD7 TOTAL SCORE: 18
IF YOU CHECKED OFF ANY PROBLEMS ON THIS QUESTIONNAIRE, HOW DIFFICULT HAVE THESE PROBLEMS MADE IT FOR YOU TO DO YOUR WORK, TAKE CARE OF THINGS AT HOME, OR GET ALONG WITH OTHER PEOPLE: VERY DIFFICULT
8. IF YOU CHECKED OFF ANY PROBLEMS, HOW DIFFICULT HAVE THESE MADE IT FOR YOU TO DO YOUR WORK, TAKE CARE OF THINGS AT HOME, OR GET ALONG WITH OTHER PEOPLE?: VERY DIFFICULT
3. WORRYING TOO MUCH ABOUT DIFFERENT THINGS: NEARLY EVERY DAY
7. FEELING AFRAID AS IF SOMETHING AWFUL MIGHT HAPPEN: MORE THAN HALF THE DAYS
8. IF YOU CHECKED OFF ANY PROBLEMS, HOW DIFFICULT HAVE THESE MADE IT FOR YOU TO DO YOUR WORK, TAKE CARE OF THINGS AT HOME, OR GET ALONG WITH OTHER PEOPLE?: VERY DIFFICULT
7. FEELING AFRAID AS IF SOMETHING AWFUL MIGHT HAPPEN: MORE THAN HALF THE DAYS
7. FEELING AFRAID AS IF SOMETHING AWFUL MIGHT HAPPEN: MORE THAN HALF THE DAYS
5. BEING SO RESTLESS THAT IT IS HARD TO SIT STILL: NEARLY EVERY DAY
IF YOU CHECKED OFF ANY PROBLEMS ON THIS QUESTIONNAIRE, HOW DIFFICULT HAVE THESE PROBLEMS MADE IT FOR YOU TO DO YOUR WORK, TAKE CARE OF THINGS AT HOME, OR GET ALONG WITH OTHER PEOPLE: VERY DIFFICULT
GAD7 TOTAL SCORE: 18
1. FEELING NERVOUS, ANXIOUS, OR ON EDGE: MORE THAN HALF THE DAYS
4. TROUBLE RELAXING: MORE THAN HALF THE DAYS
3. WORRYING TOO MUCH ABOUT DIFFERENT THINGS: NEARLY EVERY DAY
GAD7 TOTAL SCORE: 18
GAD7 TOTAL SCORE: 18
4. TROUBLE RELAXING: MORE THAN HALF THE DAYS
GAD7 TOTAL SCORE: 18
1. FEELING NERVOUS, ANXIOUS, OR ON EDGE: MORE THAN HALF THE DAYS
7. FEELING AFRAID AS IF SOMETHING AWFUL MIGHT HAPPEN: MORE THAN HALF THE DAYS
GAD7 TOTAL SCORE: 18
2. NOT BEING ABLE TO STOP OR CONTROL WORRYING: NEARLY EVERY DAY

## 2023-06-09 NOTE — PROGRESS NOTES
[facility]  June 9, 2023      Behavioral Health Clinician Progress Note    Patient Name: Alhaji Sanchez           Service Type:  Individual      Service Location:   [facility] / Email (patient reached)     Session Start Time: 13:31  Session End Time: 14:01      Session Length: 16 - 37      Attendees: Patient     Service Modality:  Video Visit:      Provider verified identity through the following two step process.  Patient provided:  Patient is known previously to provider and Patient was verified at admission/transfer    Telemedicine Visit: The patient's condition can be safely assessed and treated via synchronous audio and visual telemedicine encounter.      Reason for Telemedicine Visit: Services only offered telehealth    Originating Site (Patient Location): Patient's home    Distant Site (Provider Location): Provider Remote Setting- Home Office    Consent:  The patient/guardian has verbally consented to: the potential risks and benefits of telemedicine (video visit) versus in person care; bill my insurance or make self-payment for services provided; and responsibility for payment of non-covered services.     Patient would like the video invitation sent by:  My Chart    Mode of Communication:  Video Conference via [facility]    Distant Location (Provider):  Off-site    As the provider I attest to compliance with applicable laws and regulations related to telemedicine.    Visit Activities (Refresh list every visit): [facility] Only    Diagnostic Assessment Date: 05/18/2023  Treatment Plan Review Date: Due by third visit (next)  See Flowsheets for today's PHQ-9 and KADEN-7 results  Previous PHQ-9:     5/13/2023     6:36 AM 5/17/2023     6:17 AM 6/9/2023    12:43 PM   PHQ-9 SCORE   PHQ-9 Total Score Daniel 18 (Moderately severe depression) 17 (Moderately severe depression) 17 (Moderately severe depression)   PHQ-9 Total Score 18 17    17 17    17     Previous KADEN-7:        "3/16/2023     3:49 PM 5/11/2023     6:28 PM 6/9/2023    12:43 PM   KADEN-7 SCORE   Total Score  16 (severe anxiety) 18 (severe anxiety)   Total Score 13 16    16 18    18       GRAEME LEVEL:       View : No data to display.                DATA  Extended Session (60+ minutes): No  Interactive Complexity: No  Crisis: No  [facility] Patient: No    Treatment Objective(s) Addressed in This Session:  Target Behavior(s): Depression and anxiety    Depressed Mood: Increase interest, engagement, and pleasure in doing things  Decrease thoughts that you'd be better off dead or of suicide / self-harm  Anxiety: will experience a reduction in anxiety    Current Stressors / Issues:   Update: \"pretty good.\" Things have been about the same. Mood has been better but anxiety has been high. Overthinking and days where it is hard to sit still and focus. Not notice any specific stressors. Starts thinking about things and then it seems to get worse from there. Some days of not overthinking and not sure why or how.  Gets more irritable and procrastinates when he does feel overwhelmed. Lots of changes going on. Not as depressed, separate from the anxiety. Had some moments last week of not wanting to do anything and just wanted to sleep at home. [place] like that was depression but forced self to go do things and eventually felt better. Noticed it takes longer for him to do things like grocery shopping when he feels depressed. Tired afterward and needed a nap. Took several days to actually put groceries away.  SI: chronic SI. Tries to forget about and \"bottles\" up for a few days and then the thoughts are stronger. \"Think about just doing it.\" Denies he has a plan or intent. Does get worse before his birthday as he thinks about life and his future. Pessimism. No active safety concerns.  [facility] validated patient for his continued distress.  [facility] introduced the idea of mindful living and assisted patient in identifying ways that he can try to focus on the " present moment in the present day instead of getting wrapped up in his past or future.  [facility] processed patient making some potential changes to see if that might change his overall outlook on life including the pessimism.  [facility] assessed for safety and encouraged patient to access crisis resources and supports if he worries that he is not safe.    Stressors: trying to figure out what he wants to do with his life. Moving in with his girlfriend and unsure of changes.    Tx: willing to try again. [facility] to send resources through [employer].    Etoh: too busy to drink during the week, a few drinks last weekend  Substance: marijuana edibles- none since last session  Nicotine: just e-cig, all day.  Patient does not feel he can reduce at this time due to his anxiety.  Caffeine: decreased to 2 Mtn Dews a day and more gatorade    Most Important: medication increase or change? Anxiety medication    Progress on Treatment Objective(s) / Homework:  Minimal progress - PREPARATION (Decided to change - considering how); Intervened by negotiating a change plan and determining options / strategies for behavior change, identifying triggers, exploring social supports, and working towards setting a date to begin behavior change    Motivational Interviewing    MI Intervention: Expressed Empathy/Understanding, Supported Autonomy, Collaboration, Evocation, Open-ended questions, Reflections: simple and complex and Change talk (evoked)     Change Talk Expressed by the Patient: Desire to change Need to change Committment to change    Provider Response to Change Talk: E - Evoked more info from patient about behavior change and A - Affirmed patient's thoughts, decisions, or attempts at behavior change    Also provided psychoeducation about behavioral health condition, symptoms, and treatment options    Care Plan review completed: No    Medication Review:  No changes to current psychiatric medication(s)    Medication Compliance:  Yes    Changes in Health  Issues:   None reported    Chemical Use Review:   Substance Use: Chemical use reviewed, no active concerns identified      Tobacco Use: No change in amount of tobacco use since last session.  Patient declined discussion at this time    Assessment: Current Emotional / Mental Status (status of significant symptoms):  Risk status (Self / Other harm or suicidal ideation)  Patient has had a history of suicidal ideation: Patient reports a long history of suicidal ideation, sometimes having a plan.  He denies any history of attempts  Patient denies current fears or concerns for personal safety.  Patient reports the following current or recent suicidal ideation or behaviors: Patient continues to have chronic suicidal ideation but denies he has an active plan or intent.  He does feel safe.  Patient denies current or recent homicidal ideation or behaviors.  Patient denies current or recent self injurious behavior or ideation.  Patient denies other safety concerns.  A safety and risk management plan has not been developed at this time, however patient was encouraged to call [place] [phone] should there be a change in any of these risk factors.    Appearance:   Appropriate   Eye Contact:   Good   Psychomotor Behavior: Normal   Attitude:   Cooperative   Orientation:   All  Speech   Rate / Production: Normal    Volume:  Normal   Mood:    Anxious  Depressed   Affect:    Appropriate   Thought Content:  Clear   Thought Form:  Coherent  Logical   Insight:    Good     Diagnoses:  1. Generalized anxiety disorder    2. Moderate episode of recurrent major depressive disorder (H)        Collateral Reports Completed:  Communicated with: [facility] Team    Plan: (Homework, other):  Patient was given information about behavioral services and encouraged to schedule a follow up appointment with the clinic [facility] as needed.  He was also given information about mental health symptoms and treatment options .  CD Recommendations: No indications of CD  issues.     LATONIA Heath  June 9, 2023               LATONIA Heath  Answers for HPI/ROS submitted by the patient on 6/9/2023  If you checked off any problems, how difficult have these problems made it for you to do your work, take care of things at home, or get along with other people?: Somewhat difficult  PHQ9 TOTAL SCORE: 17  KADEN 7 TOTAL SCORE: 18

## 2023-06-09 NOTE — NURSING NOTE
Is the patient currently in the state of [place]? YES    Visit mode:VIDEO    If the visit is dropped, the patient can be reconnected by: VIDEO VISIT: Text to cell phone: [phone]    Will anyone else be joining the visit? NO      How would you like to obtain your AVS? MyChart    Are changes needed to the allergy or medication list? NO    Reason for visit: CIARA BARKER

## 2023-06-09 NOTE — PROGRESS NOTES
"Virtual Visit Details    Type of service:  Video Visit     Originating Location (pt. Location): Home    Distant Location (provider location):  Off-site  Platform used for Video Visit: Beautylish      ADMINISTRATIVE BILLING:    Time spent interviewing patient, reviewing referral documents, obtaining and reviewing outside records, communication with other health specialists, and preparing this report on today's date  Video start: 3439  Video stop: 1438    Total time: 32 mins      [facility] PSYCHIATRY SERVICE FOLLOW-UP     Name:  Alhaji Sanchez  : 1995     Patient is a 27 year old year old White, male  who [facility]  Patient was initially referred by their PCP. Patient attended the session alone.     Patient care team: Patient Care Team:  Gerry Vuong PA-C as PCP - General  Gerry Vuong PA-C as Assigned PCP  Simin Crum NP as Assigned Neuroscience Provider    INTERIM HISTORY   Pt was last seen in [facility] for intake on 18 May 23. At that time, the plan included increase citalopram to 30mg, start concerta 18mg.    Interim pt communication:  none    Available records were reviewed prior to visit.    HISTORY OF PRESENT ILLNESS     Per [facility] Tracie Payne during today's team-based visit: \"MH Update: \"pretty good.\" Things have been about the same. Mood has been better but anxiety has been high. Overthinking and days where it is hard to sit still and focus. Not notice any specific stressors. Starts thinking about things and then it seems to get worse from there. Some days of not overthinking and not sure why or how.  Gets more irritable and procrastinates when he does feel overwhelmed. Lots of changes going on. Not as depressed, separate from the anxiety. Had some moments last week of not wanting to do anything and just wanted to sleep at home. [place] like that was depression but forced self to go do things and eventually felt better. Noticed it takes longer " "for him to do things like grocery shopping when he feels depressed. Tired afterward and needed a nap. Took several days to actually put groceries away.  SI: chronic SI. Tries to forget about and \"bottles\" up for a few days and then the thoughts are stronger. \"Think about just doing it.\" Denies he has a plan or intent. Does get worse before his birthday as he thinks about life and his future. Pessimism. No active safety concerns.  Mindful living.   Stressors: trying to figure out what he wants to do with his life. Moving in with his girlfriend and unsure of changes.  Tx: willing to try again. [facility] to send resources.  Etoh: too busy to drink during the week, few last weekend  Substance: marijuana edibles- none since last session  Nicotine: just e-c-g, all day  Caffeine: decreased to 2 Mtn Dews a day and more gatorad  Most Important: medication increase or change? Anxiety medication\"    ---Psychiatry Update---  Pt will start a new job next week.   Pt still feels \"all over the place.\" He tried taking concerta at night because he thought it would be easier for him to take both medicines at once since he forgets to take medicine easily. He learned it should only be taken in the morning. Unfortunately citalopram makes him feel drowsy. He doesn't have a pill cutter and had been using scissors and didn't like that so started taking two tabs nightly. \"I think it's helping my depression.\"   He's still having problems with task completion, low motivation, disorganization, distractibility. \"I need to clean out my room at my parents house. It takes me a long time to finish projects.\" He says one day he took Concerta right away in the morning and forgot if he took it so a few hours later took another 18mg and didn't notice any problematic SE or clear improvements in ADHD sx.     He says he's been impulse-shopping. \"When I'm stressed I let my day do its thing.\" He doesn't notice he's more impulsive when he's stressed.     Suicidal " "ideation:  Yes chronic suicidal thoughts and thoughts of death. Denies morbid ideation.    PHQ9 score is 17 indicating moderately severe depression.  GAD7 score is  is 18 indicating severe anxiety.    Medication side effects: Denies any side effects.     Sleep: Citalopram is helpful for sleep. Sometimes he feels groggy the next day.   Appetite: \"Eh.\" He's had low appetite with the heat. The heat gives him headaches, which make him feel nauseated. He isn't sure if Concerta is affecting his appetite.     Medical: no new medical concerns.    MEDICATIONS                                                                                              Current medications reviewed today and are noted below.   Current psychotropic medications:   Citalopram 30mg - pt has been taking 40mg nightly  Concerta 18mg    Past psychotropic medications:  Lithium  Quetiapine  Lorazepam  Adderall - low appetite  Amitriptyline  Escitalopram  Hydroxyzine  Duloxetine  Sertraline  Vyvanse  Lurasidone  Trazodone  Desvenlafaxine  Bupropion   Haloperidol  Lamotrigine - caused anger  Propranolol - for headaches, not sure if it helped for anxiety     Supplements:   See below       5/23/23 Concerta 18mg #30    Current Outpatient Medications   Medication Sig     citalopram (CELEXA) 20 MG tablet Take 1.5 tablets (30 mg) by mouth At Bedtime     methylphenidate HCl ER (CONCERTA) 18 MG CR tablet Take 1 tablet (18 mg) by mouth every morning     No current facility-administered medications for this visit.        VITALS   There were no vitals taken for this visit.    Pulse Readings from Last 5 Encounters:   05/19/23 75   04/13/23 74   03/16/23 88   05/02/17 88     Wt Readings from Last 5 Encounters:   05/19/23 72.6 kg (160 lb)   04/13/23 73.9 kg (163 lb)   03/16/23 74.4 kg (164 lb)   05/02/17 71.2 kg (157 lb)     BP Readings from Last 5 Encounters:   05/19/23 123/75   04/13/23 137/86   03/16/23 138/84   05/02/17 126/85       LABS & IMAGING                 " "                                                                                               Recent available labs reviewed today.    Recent Labs   Lab Test 04/13/23  1002   CR 1.01   GFRESTIMATED >90     Recent Labs   Lab Test 04/13/23  1002   AST 19   ALT 24   ALKPHOS 75     Recent Labs   Lab Test 04/13/23  1002   TSH 1.53       ALLERGY & IMMUNIZATIONS       Allergies   Allergen Reactions     Lurasidone Other (See Comments)     Mold      Other reaction(s): Runny Nose     Neomycin Unknown     burning in ears- ear drops       MEDICAL & SURGICAL HISTORY    Reviewed past medical and surgical history today.   Pregnant - NA.     Past Medical History:   Diagnosis Date     Depressive disorder     Katelyn been dealing with depression  and anxiety.and adhd sience i was 8 or 9. I was also diagnosed  with borderline  personality disorder back in 2018       MENTAL STATUS EXAM:     Alertness: alert  and oriented  Appearance: adequately groomed  Behavior/Demeanor: cooperative, pleasant and calm, with good eye contact   Speech: normal and regular rate and rhythm  Language: intact and no problems  Psychomotor: normal or unremarkable  Mood: description consistent with euthymia  Affect: full range and appropriate; was congruent to mood; was congruent to content  Thought Process/Associations: unremarkable  Thought Content:  Reports chronic suicidal ideation, no plan/intent;  Denies suicidal ideation, violent ideation and delusions  Perception:  Reports none;  Denies auditory hallucinations and visual hallucinations  Insight: intact  Judgment: intact  Cognition: does  appear grossly intact; formal cognitive testing was not done  Gait and Station: MERLIN     RISK AND PROTECTIVE FACTORS     Static Risk Factors: sex, prior attempts, serious mental illness, history of MH diagnoses and/or treatment, history of hospitalization and impulsivity  Firearms/Weapons Access: Yes pt has \"a lot of firearms.\" Going to the range and hunting are \"coping " "mechanisms. I love hunting.\" Says he would never shoot himself because \"I don't want anyone to clean that up.\"   Dynamic Risk Factors: SI, emotional distress, substance use, anxiety, dramatic changes in mood, access to means and mental health stigma     Protective Factors: hope for the future, compliance with medication, future oriented, healthy intimate relationships, resilience, perceived internal locus of control, social support, access to care as needed, motivation and readiness for change, reasons for living and displaying help seeking behavior     SAFETY ASSESSMENT      Based on review of above risk and protective factors and today's exam, pt is at low acute risk of harm to self or others and chronic elevated risk due to history and risk factors. He does not meet criteria for a 72 hr hold and remains appropriate for ongoing outpatient care. The patient reports chronic SI without plan or intent. He wants to stay alive for his girlfriend and her two children. There was no deceit detected, and the patient presented in a manner that was believable. Local community safety resources printed and reviewed for patient to use if needed.     Recommended that patient call 911 or go to the local ED should there be a change in any of these risk factors.     DSM 5 DIAGNOSIS      Attention-Deficit/Hyperactivity Disorder  314.01 (F90.9) Unspecified Attention -Deficit / Hyperactivity Disorder  296.32 (F33.1) Major Depressive Disorder, Recurrent Episode, Moderate _  300.02 (F41.1) Generalized Anxiety Disorder  Substance-Related & Addictive Disorders 291.9 (F10.99) Unspecified Alcohol Related Disorder  Tobacco Use Disorder.  Specify if: In a controlled environment, Specify current severity:  305.1 (F17.200) Severe  301.83 (F60.3) Borderline Personality Disorder     DIFFERENTIAL DIAGNOSIS: caffeine use disorder     Medical comorbidities impacting or contributing to clinical picture: migraine headaches  Known issue that I take into " account for their medical decisions, no current exacerbations or new concerns.     ASSESSMENT AND PLAN       ASSESSMENT:  Alhaji Sanchez is a 27 year old White, male who presents for initial visit with [facility]ative Care Psychiatry Service ([facility]PS) for medication management.   18 May 23: Pt with hx of borderline PD, anxiety, depression, ADHD, AUD, nicotine dependence presents for eval/tx of his anxiety, ADHD, and anger outbursts today. Pt describes extensive MH treatment through [facility] last in 2019; he stopped care and D/C meds because he was overwhelmed and felt like treatment was ineffective. Recently he has been having more problems with mood, anxiety, and irritability/anger and wants to try MH care again. He was started on citalopram by his PCP and perceives slight benefit on mood/anxiety; discussed increasing to maximize effect as he denies SE problems too. He agrees. Reviewed his hx of ADHD and impacts that untreated ADHD can have on substance use, emotion regulation, and mood/anxiety. Pt agrees to trial of Concerta for ADHD sx; reviewed r/b/se. Recommended pt consider therapy and will continue to discuss next visit. Advised RTC in 3 weeks if possible but may not be able to due to his work schedule. Pt with chronic SI without plan/intent. He remains chronic elevated risk due to his history and extensive access to firearms (for sport and recreation; will not remove them).  Today 9 Jun 23: Pt with hx of borderline PD, anxiety, depression, ADHD, AUD, nicotine dependence who returns to clinic reporting ongoing depression, anxiety, ADHD symptoms. Pt increased citalopram on his own to 40mg and perceives this is helpful for depression. His ADHD remains uncontrolled and we discussed increasing Concerta to target inattention, distractibility, disorganization. He agrees to increasing dose. Given his accidental day of 36mg without problems, will increase to 36mg now. Also reviewed pt's anxiety and impulsivity. Reviewed  r/b/se clonidine off label for anxiety and impulsivity and reviewed r/b/se; pt agrees to trial. May benefit from PGx testing in the future. He has chronic si and morbid ideation. No plan or intent.   He remains chronic elevated risk due to his history and extensive access to firearms (for sport and recreation; will not remove them).     TREATMENT PLAN: Medication side effects and alternatives reviewed. Health promotion activities recommended and reviewed. All questions addressed. Education and counseling completed regarding risks and benefits of medications and psychotherapy options. Collaborative Care Psychiatry Service model reviewed today. Recommend therapy for additional support. Safety plan reviewed as indicated.     MEDICATIONS:   -continue CITALOPRAM 40mg at bedtime  -increase CONCERTA 36mg daily in the morning  -start CLONIDINE 0.1mg in the morning x 2 weeks then increase to 0.1mg TWICE DAILY      LABS/RADS:   -none     PATIENT STATUS:  [facility] offer care a specialty service for Primary Care Providers in the [facility] that seek to optimize psychotropic medications for unstable patients.  Once medications have been optimized, our providers discharge the patient back to the referring Primary Care Provider for ongoing medication management.  This type of system allows our providers to serve a high volume of patients.   -Pt will be seen for continued medication stabilization in [facility].     PSYCHOSOCIAL:   -consider therapy  -Follow up with primary care provider as planned or for acute medical concerns.     PSYCHOEDUCATION:  Medication side effects and alternatives reviewed. Health promotion activities recommended and reviewed today. All questions addressed. Education and counseling completed regarding risks and benefits of medications and psychotherapy options.  Consent provided by patient/guardian  Call the psychiatric nurse line with medication questions or concerns at  [phone].  [employer] may be used to communicate with your provider, but this is not intended to be used for emergencies.  BLACK BOX WARNING: Discussed the Food and Drug Administration (FDA) requires that all antidepressants carry a warning that some children, adolescents and young adults may be at increased risk of suicide when taking antidepressants. Anyone taking an antidepressant should be watched closely for worsening depression or unusual behavior especially in the first few weeks after starting an SSRI. Keep in mind, antidepressants are more likely to reduce suicide risk in the long run by improving mood.   STIMULANT THERAPY: Side effects discussed including but not limited to cardiac (including HTN, tachycardia, sudden death), motor/tic, appetite/growth, mood lability and sleep disruption. This is a controlled substance with risk for abuse, need to keep in a safe keep place and cannot replace lost scripts  Medlineplus.gov is information for patients.  It is run by the [employer] and it contains information about all disorders, diseases and all medications.      FOLLOW-UP: Follow up in 4-5 weeks    1. Continue all other treatments (including medications) per primary care provider and/or specialists.   2. To schedule individual or family therapy, call [facility] at [phone].   3. Follow up with primary care provider as planned or for acute medical concerns.  4. Call the psychiatric nurse line with medication questions or concerns at [phone] or [phone].  5. [employer] may be used to communicate with your care team, but this is not intended to be used for emergencies.    CRISIS RESOURCES:    1. Present to the Emergency Department as needed or call after hours crisis line at [phone] or [phone].   2. [place] Crisis Text Line: Text [place] to 502285.  3. Suicide LifeLine Chat: suicidepreventionlifeline.org/chat/.  4. National Suicide Prevention Lifeline:  996.658.5642 (TTY: 819.720.7508). Call anytime for help.  (www.suicidepreventionlifeline.org)  5. National [place] on Mental Illness (www.art.org): 517.245.1917 or 079-475-0591.  6. Mental Health Association (www.mentalhealth.org): 112.461.6749 or 438-320-9586.    Signed:   Rambo Crum DNP, PMRASHIP-BC  Collaborative Care Psychiatry Service (CCPS)

## 2023-06-09 NOTE — PATIENT INSTRUCTIONS
Jeancarlos Mane,    Thank you for our time together today in Collaborative Care Psychiatry Service (CCPS). CCPS provides brief psychiatric medication stabilization to patients referred by their Primary Care Providers. Patients are typically seen in CCPS for up to 4 appointments and then referred back to the PCP for ongoing refills unless longer term medication management by a specialist is indicated. If I believe you will benefit from long-term psychiatric care I will discuss this with you. If you are interested in seeing a psychiatrist or psychiatric nurse practitioner long-term, please send me a message in [employer] so we can refer you appropriately.     Treatment Plan:  -continue CITALOPRAM 40mg at bedtime  -increase CONCERTA 36mg daily in the morning  -start CLONIDINE 0.1mg in the morning x 2 weeks then increase to 0.1mg TWICE DAILY   Follow up in 4-6 weeks or sooner if needed.  You can call [phone] to make appointments, leave a message with our nursing team, and inquire about any mental health referrals I have placed.  Please call your pharmacy to request a refill of your medicines listed above if needed between appointments.     GERTRUDE Larsen  Collaborative [facility] Psychiatry Service  [facility]   Patient Education   Merged with [facility] Care Psychiatry Service  What to Expect  Here's what to expect from your Collaborative Care Psychiatry Service (CCPS).   About CCPS  CCPS means 2 people work together to help you get better. You'll meet with a behavioral health clinician and a psychiatric doctor. A behavioral health clinician helps people with mental health problems by talking with them. A psychiatric doctor helps people by giving them medicine.  How it works  At every visit, you'll see the behavioral health clinician (BHC) first. They'll talk with you about how you're doing and teach you how to feel better.   Then you'll see the psychiatric doctor. This doctor can help you deal with troubling thoughts and  "feelings by giving you medicine. They'll make sure you know the plan for your care.   CCPS usually takes 3 to 6 visits. If you need more visits, we may have you start seeing a different psychiatric doctor for ongoing care.  If you have any questions or concerns, we'll be glad to talk with you.  About visits  Be open  At your visits, please talk openly about your problems. It may feel hard, but it's the best way for us to help you.  Cancelling visits  If you can't come to your visit, please call us right away at [phone]. If you don't cancel at least 24 hours (1 full day) before your visit, that's \"late cancellation.\"  Being late to visits  Being very late is the same as not showing up. You will be a \"no show\" if:  Your appointment starts with a [facility], and you're more than 15 minutes late for a 30-minute (half hour) visit. This will also cancel your appointment with the psychiatric doctor.  Your appointment is with a psychiatric doctor only, and you're more than 15 minutes late for a 30-minute (half hour) visit.  Your appointment is with a psychiatric doctor only, and you're more than 30 minutes late for a 60-minute (full hour) visit.  If you cancel late or don't show up 2 times within 6 months, we may end your care.   Getting help between visits  If you need help between visits, you can call us Monday to Friday from 8 a.m. to 4:30 p.m. at [phone].  Emergency care  Call 911 or go to the nearest emergency department if your life or someone else's life is in danger.  Call 988 anytime to reach the national Suicide and Crisis hotline.  Medicine refills  To refill your medicine, call your pharmacy. You can also call [facility]'s Behavioral Access at [phone], Monday to Friday, 8 a.m. to 4:30 p.m. It can take 1 to 3 business days to get a refill.   Forms, letters, and tests  You may have papers to fill out, like FMLA, short-term disability, and workability. We can help you with these forms at your " visits, but you must have an appointment. You may need more than 1 visit for this, to be in an intensive therapy program, or both.  Before we can give you medicine for ADHD, we may refer you to get tested for it or confirm it another way.  We may not be able to give you an emotional support animal letter.  We don't do mental health checks ordered by the court.   We don't do mental health testing, but we can refer you to get tested.   Thank you for choosing us for your care.  For informational purposes only. Not to replace the advice of your health care provider. Copyright   2022 [facility]. All rights reserved. [employer] [id number] - 12/22.

## 2023-07-11 ASSESSMENT — ANXIETY QUESTIONNAIRES
6. BECOMING EASILY ANNOYED OR IRRITABLE: NEARLY EVERY DAY
5. BEING SO RESTLESS THAT IT IS HARD TO SIT STILL: MORE THAN HALF THE DAYS
4. TROUBLE RELAXING: SEVERAL DAYS
2. NOT BEING ABLE TO STOP OR CONTROL WORRYING: MORE THAN HALF THE DAYS
GAD7 TOTAL SCORE: 14
IF YOU CHECKED OFF ANY PROBLEMS ON THIS QUESTIONNAIRE, HOW DIFFICULT HAVE THESE PROBLEMS MADE IT FOR YOU TO DO YOUR WORK, TAKE CARE OF THINGS AT HOME, OR GET ALONG WITH OTHER PEOPLE: VERY DIFFICULT
7. FEELING AFRAID AS IF SOMETHING AWFUL MIGHT HAPPEN: SEVERAL DAYS
GAD7 TOTAL SCORE: 14
1. FEELING NERVOUS, ANXIOUS, OR ON EDGE: MORE THAN HALF THE DAYS
3. WORRYING TOO MUCH ABOUT DIFFERENT THINGS: NEARLY EVERY DAY

## 2023-07-14 ENCOUNTER — VIRTUAL VISIT (OUTPATIENT)
Dept: PSYCHIATRY | Facility: CLINIC | Age: 28
End: 2023-07-14
Payer: COMMERCIAL

## 2023-07-14 DIAGNOSIS — F33.1 MAJOR DEPRESSIVE DISORDER, RECURRENT EPISODE, MODERATE (H): Primary | ICD-10-CM

## 2023-07-14 DIAGNOSIS — F60.3 BORDERLINE PERSONALITY DISORDER (H): ICD-10-CM

## 2023-07-14 DIAGNOSIS — F90.9 ATTENTION DEFICIT HYPERACTIVITY DISORDER (ADHD), UNSPECIFIED ADHD TYPE: ICD-10-CM

## 2023-07-14 DIAGNOSIS — F41.1 GAD (GENERALIZED ANXIETY DISORDER): ICD-10-CM

## 2023-07-14 DIAGNOSIS — F17.200 TOBACCO USE DISORDER: ICD-10-CM

## 2023-07-14 DIAGNOSIS — F17.200 NICOTINE DEPENDENCE, UNCOMPLICATED, UNSPECIFIED NICOTINE PRODUCT TYPE: ICD-10-CM

## 2023-07-14 PROCEDURE — 99215 OFFICE O/P EST HI 40 MIN: CPT | Mod: VID | Performed by: STUDENT IN AN ORGANIZED HEALTH CARE EDUCATION/TRAINING PROGRAM

## 2023-07-14 RX ORDER — CLONIDINE HYDROCHLORIDE 0.1 MG/1
TABLET ORAL
Qty: 74 TABLET | Refills: 0 | Status: SHIPPED | OUTPATIENT
Start: 2023-07-14 | End: 2023-08-01

## 2023-07-14 RX ORDER — CITALOPRAM HYDROBROMIDE 40 MG/1
40 TABLET ORAL DAILY
Qty: 60 TABLET | Refills: 0 | Status: SHIPPED | OUTPATIENT
Start: 2023-07-14 | End: 2023-08-01

## 2023-07-14 ASSESSMENT — PATIENT HEALTH QUESTIONNAIRE - PHQ9
SUM OF ALL RESPONSES TO PHQ QUESTIONS 1-9: 19
SUM OF ALL RESPONSES TO PHQ QUESTIONS 1-9: 19
10. IF YOU CHECKED OFF ANY PROBLEMS, HOW DIFFICULT HAVE THESE PROBLEMS MADE IT FOR YOU TO DO YOUR WORK, TAKE CARE OF THINGS AT HOME, OR GET ALONG WITH OTHER PEOPLE: SOMEWHAT DIFFICULT

## 2023-07-14 NOTE — NURSING NOTE
Is the patient currently in the state of [place]? YES    Visit mode:VIDEO    If the visit is dropped, the patient can be reconnected by: VIDEO VISIT: Text to cell phone: [phone]    Will anyone else be joining the visit? NO      How would you like to obtain your AVS? MyChart    Are changes needed to the allergy or medication list? NO    Reason for visit: RECHECK

## 2023-07-14 NOTE — PROGRESS NOTES
Virtual Visit Details    Type of service:  Video Visit     Originating Location (pt. Location): Home    Distant Location (provider location):  Off-site  Platform used for Video Visit: [employer]     ADMINISTRATIVE BILLING:    Time spent interviewing patient, reviewing referral documents, obtaining and reviewing outside records, communication with other health specialists, and preparing this report on today's date  Video start: 1031  Video stop: 1103  Total time: 47 mins      [facility] PSYCHIATRIC SERVICE FOLLOW UP     Name:  Alhaji Sanchez  : 1995     Telemedicine Visit: The patient's condition can be safely assessed and treated via synchronous audio and visual telemedicine encounter.      Reason for Telemedicine Visit: COVID 19 pandemic and the social and physical recommendations by the CDC and MD.      Consent:  The patient/guardian has verbally consented to: the potential risks and benefits of telemedicine (video visit or phone) versus in person care; bill my insurance or make self-payment for services provided; and responsibility for payment of non-covered services.     As the provider I attest to compliance with applicable laws and regulations related to telemedicine.    IDENTIFICATION     Patient is a 27 year old year old White, male  who presents for follow-up medication management with [facility].  Patient was initially referred by their PCP. Patient attended the session alone.   The [facility] psychiat[facility] providers act as a specialty service for Primary Care Providers in the [facility] who seek to optimize medications for unstable patients.  Once medications have been optimized, [facility] providers discharge the patient back to the referring Primary Care Provider for ongoing medication management.  This type of system allows [facility] to serve a high volume of patients.      Patient care team: Patient Care Team:  Gerry Vuong PA-C as PCP - General  Gerry Vuong PA-C as Assigned PCP  Skyla  "REJI Duval as Assigned Neuroscience Provider    INTERIM HISTORY   [facility] June 23. At that time, the plan included   -continue CITALOPRAM 40mg at bedtime  -increase CONCERTA 36mg daily in the morning  -start CLONIDINE 0.1mg in the morning x 2 weeks then increase to 0.1mg TWICE DAILY .    Interim pt communication:  none    Available records were reviewed prior to visit.    HISTORY OF PRESENT ILLNESS     Currently: \"I'm doing well.\" Pt says his new job has started out ok. 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   He also recently moved in with his girlfriend and her two children ages 2 and 6. He says its going well as he's slowly been moving in over the past month+.    Pt doesn't think increased Concerta is helping his ADHD symptoms.     Mood/anxiety: Pt describes a lot of anxious thinking and trouble controlling these thoughts.   Suicidal ideation:  Endorses chronic SI and morbid ideation. No plan or intent.   PHQ9 score is 19 indicating moderately severe depression.  GAD7 score is is 14 indicating moderate anxiety.    Sleep: Pt's sleep is poor. He describes problems with sleep onset insomnia. He says he doesn't feel tired and can't turn his mind off. \"Probably can't take my mind off things, I guess.\" He describes second guessing his decision to be in a relationship and move in with his girlfriend.     Medications: Pt says he has not picked up clonidine due to back order at the pharmacy. He says he did increase concerta but  doesn't show it was filled since May.     Medical: No new medical concerns    SUBSTANCE USE HISTORY    Tobacco use: e-cig throughout the day  Caffeine:  Yes  2 sodas/day  Current alcohol:  Socially, definitely on weekends  Current substance use: cannabis edibles socially  Past use alcohol/substance use: see above    MEDICATIONS                "                                                                               Current medications reviewed today and are noted below.   Current psychotropic medications:   CITALOPRAM 40mg at bedtime  CONCERTA 36mg daily in the morning  CLONIDINE 0.1mg TWICE DAILY     Past psychotropic medications:  Lithium  Quetiapine  Lorazepam  Adderall - low appetite  Amitriptyline  Escitalopram  Hydroxyzine  Duloxetine  Sertraline  Vyvanse  Lurasidone  Trazodone  Desvenlafaxine  Bupropion   Haloperidol  Lamotrigine - caused anger  Propranolol - for headaches, not sure if it helped for anxiety     Supplements:   See below      5/23/23 Concerta 18mg #30    Current Outpatient Medications   Medication Sig     citalopram (CELEXA) 40 MG tablet Take 1 tablet (40 mg) by mouth daily     cloNIDine (CATAPRES) 0.1 MG tablet Take 1 tablet (0.1 mg) by mouth daily for 14 days, THEN 1 tablet (0.1 mg) 2 times daily for 30 days.     methylphenidate HCl ER (CONCERTA) 36 MG CR tablet Take 1 tablet (36 mg) by mouth every morning     No current facility-administered medications for this visit.        VITALS   There were no vitals taken for this visit.    Pulse Readings from Last 5 Encounters:   05/19/23 75   04/13/23 74   03/16/23 88   05/02/17 88     Wt Readings from Last 5 Encounters:   05/19/23 72.6 kg (160 lb)   04/13/23 73.9 kg (163 lb)   03/16/23 74.4 kg (164 lb)   05/02/17 71.2 kg (157 lb)     BP Readings from Last 5 Encounters:   05/19/23 123/75   04/13/23 137/86   03/16/23 138/84   05/02/17 126/85       LABS & IMAGING                                                                                                                Recent available labs reviewed today.    Recent Labs   Lab Test 04/13/23  1002   CR 1.01   GFRESTIMATED >90     Recent Labs   Lab Test 04/13/23  1002   AST 19   ALT 24   ALKPHOS 75     Recent Labs   Lab Test 04/13/23  1002   TSH 1.53       ALLERGY & IMMUNIZATIONS       Allergies   Allergen Reactions     Lurasidone  "Other (See Comments)     Mold      Other reaction(s): Runny Nose     Neomycin Unknown     burning in ears- ear drops       MEDICAL & SURGICAL HISTORY    Reviewed past medical and surgical history today.   Pregnant - Yes and No: No     Past Medical History:   Diagnosis Date     Depressive disorder     Katelyn been dealing with depression  and anxiety.and adhd sience i was 8 or 9. I was also diagnosed  with borderline  personality disorder back in 2018       MENTAL STATUS EXAM:     Alertness: alert  and oriented  Appearance: adequately groomed  Behavior/Demeanor: cooperative, pleasant and calm, with good eye contact   Speech: normal and regular rate and rhythm  Language: intact and no problems  Psychomotor: normal or unremarkable  Mood: description consistent with euthymia; \"I'm ok.\"  Affect: full range and appropriate; was congruent to mood; was congruent to content  Thought Process/Associations: unremarkable  Thought Content:  Reports chronic suicidal ideation, no plan/intent;  Denies suicidal ideation, violent ideation and delusions  Perception:  Reports none;  Denies auditory hallucinations and visual hallucinations  Insight: intact  Judgment: intact  Cognition: does  appear grossly intact; formal cognitive testing was not done  Gait and Station: MERLIN     RISK AND PROTECTIVE FACTORS     Static Risk Factors: sex, prior attempts, serious mental illness, history of MH diagnoses and/or treatment, history of hospitalization and impulsivity  Firearms/Weapons Access: Yes pt has \"a lot of firearms.\" Going to the range and hunting are \"coping mechanisms. I love hunting.\" Says he would never shoot himself because \"I don't want anyone to clean that up.\"   Dynamic Risk Factors: SI, emotional distress, substance use, anxiety, dramatic changes in mood, access to means and mental health stigma     Protective Factors: hope for the future, compliance with medication, future oriented, healthy intimate relationships, resilience, " perceived internal locus of control, social support, access to care as needed, motivation and readiness for change, reasons for living and displaying help seeking behavior     SAFETY ASSESSMENT      Based on review of above risk and protective factors and today's exam, pt is at low acute risk of harm to self or others and chronic elevated risk due to history and risk factors. He does not meet criteria for a 72 hr hold and remains appropriate for ongoing outpatient care. The patient reports chronic SI without plan or intent. He wants to stay alive for his girlfriend and her two children. There was no deceit detected, and the patient presented in a manner that was believable. Local community safety resources printed and reviewed for patient to use if needed.     Recommended that patient call 911 or go to the local ED should there be a change in any of these risk factors.     DSM 5 DIAGNOSIS      Attention-Deficit/Hyperactivity Disorder  314.01 (F90.9) Unspecified Attention -Deficit / Hyperactivity Disorder  296.32 (F33.1) Major Depressive Disorder, Recurrent Episode, Moderate _  300.02 (F41.1) Generalized Anxiety Disorder  Substance-Related & Addictive Disorders 291.9 (F10.99) Unspecified Alcohol Related Disorder  Tobacco Use Disorder.  Specify if: In a controlled environment, Specify current severity:  305.1 (F17.200) Severe  301.83 (F60.3) Borderline Personality Disorder     DIFFERENTIAL DIAGNOSIS: caffeine use disorder     Medical comorbidities impacting or contributing to clinical picture: migraine headaches  Known issue that I take into account for their medical decisions, no current exacerbations or new concerns.     ASSESSMENT AND PLAN       ASSESSMENT:  Alhaji Sanchez is a 27 year old White, male who presents for initial visit with [facility] Care Psychiat[facility] Service ([facility]) for medication management.   9 Jun 23: Pt with hx of borderline PD, anxiety, depression, ADHD, AUD, nicotine dependence who returns  "to clinic reporting ongoing depression, anxiety, ADHD symptoms. Pt increased citalopram on his own to 40mg and perceives this is helpful for depression. His ADHD remains uncontrolled and we discussed increasing Concerta to target inattention, distractibility, disorganization. He agrees to increasing dose. Given his accidental day of 36mg without problems, will increase to 36mg now. Also reviewed pt's anxiety and impulsivity. Reviewed r/b/se clonidine off label for anxiety and impulsivity and reviewed r/b/se; pt agrees to trial. May benefit from PGx testing in the future. He has chronic si and morbid ideation. No plan or intent.   He remains chronic elevated risk due to his history and extensive access to firearms (for sport and recreation; will not remove them).  Today 14 July 23: Pt with hx of borderline PD, anxiety, depression, ADHD, AUD, nicotine dependence who returns to clinic reporting \"doing good\" despite high subjective PHQ/GAD7 screeners and chronic SI thoughts without plan/intent. He describes fairly high anxiety and ruminative thoughts. We discussed rec for therapy at last visit; pt says he forgot to follow up on that. We also discussed his mediciation today and he was unable to fill clonidine apparently and PDMP does not show he filled concerta, either. Discussed filling rx and trialing these before next visit in 3 weeks.  Discussed my upcoming transition out of the organization. Reviewed returning to PCP with recommendations for next steps vs referral to long term psychiatry for ongoing psychiatry support. Pt agrees to referral to long term psychiatry; placed today.   He remains chronic elevated risk due to his history and extensive access to firearms (for sport and recreation; will not remove them).   TREATMENT PLAN: Medication side effects and alternatives reviewed. Health promotion activities recommended and reviewed. All questions addressed. Education and counseling completed regarding risks and " benefits of medications and psychotherapy options. Collaborative Care Psychiatry Service model reviewed today. Recommend therapy for additional support. Safety plan reviewed as indicated.     MEDICATIONS:   -continue CITALOPRAM 40mg at bedtime  -increase CONCERTA 36mg daily in the morning  -start CLONIDINE 0.1mg in the morning x 2 weeks then increase to 0.1mg TWICE DAILY     LABS/RADS:   -None at this time    PATIENT STATUS:  [facility] offer care a specialty service for Primary Care Providers in the [facility] that seek to optimize psychotropic medications for unstable patients.  Once medications have been optimized, our providers discharge the patient back to the referring Primary Care Provider for ongoing medication management.  This type of system allows our providers to serve a high volume of patients.   -Pt has been referred to long term community psychiatry care on 7/14/23 and provider agreed to provide bridging for three months, or until patient is established with new community provider, whichever comes first.  Last day of [facility] care would be 10/14/23, regardless if established with new provider yet or not (unless other arrangements were discussed and agreed upon), per collaborative care guidelines.  Patient verbally agreed to this recommended plan on 7/14/23\.    PSYCHOSOCIAL:   -consider therapy  -Follow up with primary care provider as planned or for acute medical concerns.    PSYCHOEDUCATION:  Medication side effects and alternatives reviewed. Health promotion activities recommended and reviewed today. All questions addressed. Education and counseling completed regarding risks and benefits of medications and psychotherapy options.  Consent provided by patient/guardian  Call the psychiatric nurse line with medication questions or concerns at [phone].  [employer] may be used to communicate with your provider, but this is not intended to be used for emergencies.  BLACK BOX WARNING:  Discussed the Food and Drug Administration (FDA) requires that all antidepressants carry a warning that some children, adolescents and young adults may be at increased risk of suicide when taking antidepressants. Anyone taking an antidepressant should be watched closely for worsening depression or unusual behavior especially in the first few weeks after starting an SSRI. Keep in mind, antidepressants are more likely to reduce suicide risk in the long run by improving mood.   STIMULANT THERAPY: Side effects discussed including but not limited to cardiac (including HTN, tachycardia, sudden death), motor/tic, appetite/growth, mood lability and sleep disruption. This is a controlled substance with risk for abuse, need to keep in a safe keep place and cannot replace lost scripts  Medlineplus.gov is information for patients.  It is run by the [employer] and it contains information about all disorders, diseases and all medications.      FOLLOW-UP: Follow up in 3 weeks    1. Continue all other treatments (including medications) per primary care provider and/or specialists.   2. To schedule individual or family therapy, call [facility] at [phone].   3. Follow up with primary care provider as planned or for acute medical concerns.  4. Call the psychiatric nurse line with medication questions or concerns at [phone] or [phone].  5. [employer] may be used to communicate with your care team, but this is not intended to be used for emergencies.    CRISIS RESOURCES:    1. Present to the Emergency Department as needed or call after hours crisis line at [phone] or [phone].   2. [place] Crisis Text Line: Text [place] to 189596.  3. Suicide LifeLine Chat: suicidepreventionlifeline.org/chat/.  4. National Suicide Prevention Lifeline: 783.775.2402 (TTY: 343.173.1053). Call anytime for help.  (www.suicidepreventionlifeline.org)  5. National [place] on Mental Illness (www.art.org):  209-000-8018 or 698-661-2905.  6. Mental Health Association (www.mentalhealth.org): 168-864-8781 or 686-162-7960.      Signed:   Rambo Crum DNP, PMRASHIP-BC  Collaborative Care Psychiatry Service ([facility]PS)

## 2023-07-14 NOTE — PATIENT INSTRUCTIONS
Jeancarlos Mane,    Treatment Plan:  -continue CITALOPRAM 40mg at bedtime  -increase CONCERTA 36mg daily in the morning  -start CLONIDINE 0.1mg in the morning x 2 weeks then increase to 0.1mg TWICE DAILY   I am referring you to long term psychiatry to keep working on your mental health. See below for more info.   Follow up in Aug 1 @ 3pm or sooner if needed.  You can call [phone] to make appointments, leave a message with our nursing team, and inquire about any mental health referrals I have placed.  Please call your pharmacy to request a refill of your medicines listed above if needed between appointments.     LONG TERM REFERRAL  You have been referred for long-term psychiatric care today, 7/14/23, due to the complexity, chronicity, and severity of your psychiatric concerns.  It is my opinion that your care will be more optimally managed by a long-term psychiatric prescriber versus returning your psychiatric care back to your primary care provider for ongoing management.   The intake team will call you to schedule your long term psychiatry intake within 5 business days. Call [phone] if you are not called to schedule by next week.  Send me a message with the date/time/location of your long term psychiatry intake to keep me in the loop.    GERTRUDE Larsen  Collaborative Care Psychiatry Service  [facility]   Patient Education   Collaborative Care Psychiatry Service  What to Expect  Here's what to expect from your Collaborative Care Psychiatry Service (CCPS).   About [facility]  CCPS means 2 people work together to help you get better. You'll meet with a behavioral health clinician and a psychiatric doctor. A behavioral health clinician helps people with mental health problems by talking with them. A psychiatric doctor helps people by giving them medicine.  How it works  At every visit, you'll see the behavioral health clinician (BHC) first. They'll talk with you about how you're doing and teach you how to  "feel better.   Then you'll see the psychiatric doctor. This doctor can help you deal with troubling thoughts and feelings by giving you medicine. They'll make sure you know the plan for your care.   [facility]PS usually takes 3 to 6 visits. If you need more visits, we may have you start seeing a different psychiatric doctor for ongoing care.  If you have any questions or concerns, we'll be glad to talk with you.  About visits  Be open  At your visits, please talk openly about your problems. It may feel hard, but it's the best way for us to help you.  Cancelling visits  If you can't come to your visit, please call us right away at [phone]. If you don't cancel at least 24 hours (1 full day) before your visit, that's \"late cancellation.\"  Being late to visits  Being very late is the same as not showing up. You will be a \"no show\" if:  Your appointment starts with a [facility], and you're more than 15 minutes late for a 30-minute (half hour) visit. This will also cancel your appointment with the psychiatric doctor.  Your appointment is with a psychiatric doctor only, and you're more than 15 minutes late for a 30-minute (half hour) visit.  Your appointment is with a psychiatric doctor only, and you're more than 30 minutes late for a 60-minute (full hour) visit.  If you cancel late or don't show up 2 times within 6 months, we may end your care.   Getting help between visits  If you need help between visits, you can call us Monday to Friday from 8 a.m. to 4:30 p.m. at [phone].  Emergency care  Call 911 or go to the nearest emergency department if your life or someone else's life is in danger.  Call 498 anytime to reach the national Suicide and Crisis hotline.  Medicine refills  To refill your medicine, call your pharmacy. You can also call [facility]'s Behavioral Access at [phone], Monday to Friday, 8 a.m. to 4:30 p.m. It can take 1 to 3 business days to get a refill.   Forms, letters, and tests  You may have " papers to fill out, like FMLA, short-term disability, and workability. We can help you with these forms at your visits, but you must have an appointment. You may need more than 1 visit for this, to be in an intensive therapy program, or both.  Before we can give you medicine for ADHD, we may refer you to get tested for it or confirm it another way.  We may not be able to give you an emotional support animal letter.  We don't do mental health checks ordered by the court.   We don't do mental health testing, but we can refer you to get tested.   Thank you for choosing us for your care.  For informational purposes only. Not to replace the advice of your health care provider. Copyright   2022 [facility]. All rights reserved. DoYouRemember [id number] - 12/22.

## 2023-08-01 ENCOUNTER — VIRTUAL VISIT (OUTPATIENT)
Dept: PSYCHIATRY | Facility: CLINIC | Age: 28
End: 2023-08-01

## 2023-08-01 DIAGNOSIS — F33.1 MAJOR DEPRESSIVE DISORDER, RECURRENT EPISODE, MODERATE (H): ICD-10-CM

## 2023-08-01 DIAGNOSIS — F41.1 GAD (GENERALIZED ANXIETY DISORDER): ICD-10-CM

## 2023-08-01 DIAGNOSIS — F17.200 NICOTINE DEPENDENCE, UNCOMPLICATED, UNSPECIFIED NICOTINE PRODUCT TYPE: ICD-10-CM

## 2023-08-01 DIAGNOSIS — F60.3 BORDERLINE PERSONALITY DISORDER (H): ICD-10-CM

## 2023-08-01 DIAGNOSIS — F90.9 ATTENTION DEFICIT HYPERACTIVITY DISORDER (ADHD), UNSPECIFIED ADHD TYPE: Primary | ICD-10-CM

## 2023-08-01 PROCEDURE — 99215 OFFICE O/P EST HI 40 MIN: CPT | Mod: VID | Performed by: STUDENT IN AN ORGANIZED HEALTH CARE EDUCATION/TRAINING PROGRAM

## 2023-08-01 RX ORDER — CITALOPRAM HYDROBROMIDE 20 MG/1
20 TABLET ORAL DAILY
Qty: 30 TABLET | Refills: 2 | Status: SHIPPED | OUTPATIENT
Start: 2023-08-01 | End: 2023-10-31

## 2023-08-01 RX ORDER — CLONIDINE HYDROCHLORIDE 0.1 MG/1
0.1 TABLET ORAL 2 TIMES DAILY
Qty: 60 TABLET | Refills: 2 | Status: SHIPPED | OUTPATIENT
Start: 2023-08-01 | End: 2023-11-28

## 2023-08-01 NOTE — PATIENT INSTRUCTIONS
"Jeancarlos Mane,    Treatment Plan:  -decrease CITALOPRAM to 20mg at bedtime  -increase CONCERTA 36mg daily in the morning  -continue CLONIDINE 0.1mg TWICE DAILY   Follow up with long term psychiatry on 10/3/23 as scheduled.   You can call [phone] to make appointments, leave a message with our nursing team, and inquire about any mental health referrals I have placed.  Please call your pharmacy to request a refill of your medicines listed above if needed between appointments.     GERTRUDE Larsen  Collaborative Care Psychiatry Service  [facility]   Patient Education   Collaborative Care Psychiatry Service  What to Expect  Here's what to expect from your Collaborative Care Psychiatry Service (CCPS).   About CCPS  CCPS means 2 people work together to help you get better. You'll meet with a behavioral health clinician and a psychiatric doctor. A behavioral health clinician helps people with mental health problems by talking with them. A psychiatric doctor helps people by giving them medicine.  How it works  At every visit, you'll see the behavioral health clinician (BHC) first. They'll talk with you about how you're doing and teach you how to feel better.   Then you'll see the psychiatric doctor. This doctor can help you deal with troubling thoughts and feelings by giving you medicine. They'll make sure you know the plan for your care.   CCPS usually takes 3 to 6 visits. If you need more visits, we may have you start seeing a different psychiatric doctor for ongoing care.  If you have any questions or concerns, we'll be glad to talk with you.  About visits  Be open  At your visits, please talk openly about your problems. It may feel hard, but it's the best way for us to help you.  Cancelling visits  If you can't come to your visit, please call us right away at [phone]. If you don't cancel at least 24 hours (1 full day) before your visit, that's \"late cancellation.\"  Being late to visits  Being very late " "is the same as not showing up. You will be a \"no show\" if:  Your appointment starts with a [facility], and you're more than 15 minutes late for a 30-minute (half hour) visit. This will also cancel your appointment with the psychiatric doctor.  Your appointment is with a psychiatric doctor only, and you're more than 15 minutes late for a 30-minute (half hour) visit.  Your appointment is with a psychiatric doctor only, and you're more than 30 minutes late for a 60-minute (full hour) visit.  If you cancel late or don't show up 2 times within 6 months, we may end your care.   Getting help between visits  If you need help between visits, you can call us Monday to Friday from 8 a.m. to 4:30 p.m. at [phone].  Emergency care  Call 911 or go to the nearest emergency department if your life or someone else's life is in danger.  Call 098 anytime to reach the national Suicide and Crisis hotline.  Medicine refills  To refill your medicine, call your pharmacy. You can also call [facility] [place] at [phone], Monday to Friday, 8 a.m. to 4:30 p.m. It can take 1 to 3 business days to get a refill.   Forms, letters, and tests  You may have papers to fill out, like FMLA, short-term disability, and workability. We can help you with these forms at your visits, but you must have an appointment. You may need more than 1 visit for this, to be in an intensive therapy program, or both.  Before we can give you medicine for ADHD, we may refer you to get tested for it or confirm it another way.  We may not be able to give you an emotional support animal letter.  We don't do mental health checks ordered by the court.   We don't do mental health testing, but we can refer you to get tested.   Thank you for choosing us for your care.  For informational purposes only. Not to replace the advice of your health care provider. Copyright   2022 [facility]. All rights reserved. [employer] [id number] - 12/22.         "

## 2023-08-01 NOTE — Clinical Note
Jeancarlos Garrison  Alhaji has a complex history with multiple hospitalizations, suicide attempts, many med trials, and therapy all through [facility] in the past. He has recently re-engaged with care with a hx of BPD, MDD, KADEN. He declined transition clinic care and has 2mo of refills before he sees you in early Oct.  Rambo

## 2023-08-01 NOTE — PROGRESS NOTES
Virtual Visit Details    Type of service:  Video Visit   Video Start Time: 3:02 PM  Video End Time:3:04 PM  Started again:  1510  Ended: 1535    Originating Location (pt. Location): Home    Distant Location (provider location):  Off-site  Platform used for Video Visit: [employer]    ADMINISTRATIVE BILLING:    Time spent interviewing patient, reviewing referral documents, obtaining and reviewing outside records, communication with other health specialists, and preparing this report on today's date  Total time: 41 mins      [facility] PSYCHIATRIC SERVICE FOLLOW UP     Name:  Alhaji Sanchez  : 1995     Telemedicine Visit: The patient's condition can be safely assessed and treated via synchronous audio and visual telemedicine encounter.      Reason for Telemedicine Visit: COVID 19 pandemic and the social and physical recommendations by the CDC and MD.      Consent:  The patient/guardian has verbally consented to: the potential risks and benefits of telemedicine (video visit or phone) versus in person care; bill my insurance or make self-payment for services provided; and responsibility for payment of non-covered services.     As the provider I attest to compliance with applicable laws and regulations related to telemedicine.    IDENTIFICATION     Patient is a 28 year old year old White, male  who presents for follow-up medication management with CCPS.  Patient was initially referred by their PCP. Patient attended the session alone.   The [facility] psychiatry providers act as a specialty service for Primary Care Providers in the [facility] who seek to optimize medications for unstable patients.  Once medications have been optimized, [facility] providers discharge the patient back to the referring Primary Care Provider for ongoing medication management.  This type of system allows [facility] to serve a high volume of patients.      Patient care team: Patient Care Team:  Gerry Vuong PA-C as PCP -  "Gerry Seaman PA-C as Assigned PCP  Simin Crum NP as Assigned Neuroscience Provider    INTERIM HISTORY   Pt was last seen in [facility] for follow-up on 14 July 23. At that time, the plan included   -continue CITALOPRAM 40mg at bedtime  -increase CONCERTA 36mg daily in the morning  -start CLONIDINE 0.1mg in the morning x 2 weeks then increase to 0.1mg TWICE DAILY .    Interim pt communication:  none    Available records were reviewed prior to visit.    HISTORY OF PRESENT ILLNESS     Currently: Pt has been doing \"ok.\" He doesn't like the heat much and that makes him frustrated. 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 He's a useless piece of shit.\"   Pt had a hard time sleeping last night as his girlfriend learned she has \"live cancer cells down there\" and that she might need a hysterectomy. He's giving more thought to therapy because of this but doesn't want to do it.     Mood/anxiety: Grumpy/irritable with recent change in weather causing excessively hot weather.   \"I'm more manic right now. I'm not feeling depressed and stuff.\" He says this feeling of not being depressed lasts for about 3 weeks and then \"I go into one of my depressive slumps.\" Depressive sx last 2 days to months on end.   Suicidal ideation:   chronic suicidal thoughts, no plan or intent.     PHQ2 score is 2 indicating minimal depression.  GAD2 score is  2 indicating subclinical anxiety    Sleep: Pt wake sup tired most nights. He has trouble falling asleep.  Appetite: No major appetite changes. Weight stable.     Medications: Pt felt citalopram made him feel too tired. He ended up stopping it for a week to see if it made him feel more tired in the morning. After restarting it he did notice it's helped with his depression. He's tolerating clonidine but doesn't notice if it's having    Discussed past medicines. Lithium was \"weird.\" He never got serum levels because \"that means I would have to go to " "the doctor.\"     Medical: No new medical problems.     SUBSTANCE USE HISTORY    Tobacco use: e-cig throughout the day  Caffeine:  Yes  2 sodas/day  Current alcohol:  Socially, definitely on weekends  Current substance use: cannabis edibles socially  Past use alcohol/substance use: see above    MEDICATIONS                                                                                              Current medications reviewed today and are noted below.   Current psychotropic medications:   CITALOPRAM 40mg at bedtime  CONCERTA 36mg daily in the morning - not filled per   CLONIDINE 0.1mg TWICE DAILY      Past psychotropic medications:  Lithium  Quetiapine  Lorazepam  Adderall - low appetite  Amitriptyline  Escitalopram  Hydroxyzine  Duloxetine  Sertraline  Vyvanse  Lurasidone  Trazodone  Desvenlafaxine  Bupropion   Haloperidol  Lamotrigine - caused anger  Propranolol - for headaches, not sure if it helped for anxiety     Supplements:   See below      5/23/23 Concerta 18mg #30    Current Outpatient Medications   Medication Sig    citalopram (CELEXA) 40 MG tablet Take 1 tablet (40 mg) by mouth daily    cloNIDine (CATAPRES) 0.1 MG tablet Take 1 tablet (0.1 mg) by mouth daily for 14 days, THEN 1 tablet (0.1 mg) 2 times daily for 30 days.    methylphenidate HCl ER (CONCERTA) 36 MG CR tablet Take 1 tablet (36 mg) by mouth every morning     No current facility-administered medications for this visit.        VITALS   There were no vitals taken for this visit.    Pulse Readings from Last 5 Encounters:   05/19/23 75   04/13/23 74   03/16/23 88   05/02/17 88     Wt Readings from Last 5 Encounters:   05/19/23 72.6 kg (160 lb)   04/13/23 73.9 kg (163 lb)   03/16/23 74.4 kg (164 lb)   05/02/17 71.2 kg (157 lb)     BP Readings from Last 5 Encounters:   05/19/23 123/75   04/13/23 137/86   03/16/23 138/84   05/02/17 126/85       LABS & IMAGING                                                                                         " "                       Recent available labs reviewed today.    Recent Labs   Lab Test 04/13/23  1002   CR 1.01   GFRESTIMATED >90     Recent Labs   Lab Test 04/13/23  1002   AST 19   ALT 24   ALKPHOS 75     Recent Labs   Lab Test 04/13/23  1002   TSH 1.53       ALLERGY & IMMUNIZATIONS       Allergies   Allergen Reactions    Lurasidone Other (See Comments)    Mold      Other reaction(s): Runny Nose    Neomycin Unknown     burning in ears- ear drops       MEDICAL & SURGICAL HISTORY    Reviewed past medical and surgical history today.   Pregnant - Yes and No: No     Past Medical History:   Diagnosis Date    Depressive disorder     Katelyn been dealing with depression  and anxiety.and adhd sience i was 8 or 9. I was also diagnosed  with borderline  personality disorder back in 2018       MENTAL STATUS EXAM:     Alertness: alert  and oriented  Appearance: adequately groomed  Behavior/Demeanor: cooperative, pleasant and calm, with good eye contact   Speech: normal and regular rate and rhythm  Language: intact and no problems  Psychomotor: normal or unremarkable  Mood: \"good\"  Affect: full range and appropriate; was congruent to mood; was congruent to content  Thought Process/Associations: unremarkable  Thought Content:  Reports chronic suicidal ideation, no plan/intent;  Denies suicidal ideation, violent ideation and delusions  Perception:  Reports none;  Denies auditory hallucinations and visual hallucinations  Insight: intact  Judgment: intact  Cognition: does  appear grossly intact; formal cognitive testing was not done  Gait and Station: MERLIN     RISK AND PROTECTIVE FACTORS     Static Risk Factors: sex, prior attempts, serious mental illness, history of MH diagnoses and/or treatment, history of hospitalization and impulsivity  Firearms/Weapons Access: Yes pt has \"a lot of firearms.\" Going to the range and hunting are \"coping mechanisms. I love hunting.\" Says he would never shoot himself because \"I don't want anyone to " "clean that up.\"   Dynamic Risk Factors: SI, emotional distress, substance use, anxiety, dramatic changes in mood, access to means and mental health stigma     Protective Factors: hope for the future, compliance with medication, future oriented, healthy intimate relationships, resilience, perceived internal locus of control, social support, access to care as needed, motivation and readiness for change, reasons for living and displaying help seeking behavior     SAFETY ASSESSMENT      Based on review of above risk and protective factors and today's exam, pt is at low acute risk of harm to self or others and chronic elevated risk due to history and risk factors. He does not meet criteria for a 72 hr hold and remains appropriate for ongoing outpatient care. The patient reports chronic SI without plan or intent. He wants to stay alive for his girlfriend and her two children. There was no deceit detected, and the patient presented in a manner that was believable. Local community safety resources printed and reviewed for patient to use if needed.     Recommended that patient call 911 or go to the local ED should there be a change in any of these risk factors.     DSM 5 DIAGNOSIS      Attention-Deficit/Hyperactivity Disorder  314.01 (F90.9) Unspecified Attention -Deficit / Hyperactivity Disorder  296.32 (F33.1) Major Depressive Disorder, Recurrent Episode, Moderate _  300.02 (F41.1) Generalized Anxiety Disorder  Substance-Related & Addictive Disorders 291.9 (F10.99) Unspecified Alcohol Related Disorder  Tobacco Use Disorder.  Specify if: In a controlled environment, Specify current severity:  305.1 (F17.200) Severe  301.83 (F60.3) Borderline Personality Disorder     DIFFERENTIAL DIAGNOSIS: caffeine use disorder, bipolar affective disorder     Medical comorbidities impacting or contributing to clinical picture: migraine headaches  Known issue that I take into account for their medical decisions, no current exacerbations or " "new concerns.     ASSESSMENT AND PLAN       ASSESSMENT:  Alhaji Sanchez is a 27 year old White, male who presents for initial visit with Collaborative Care Psychiatry Service (CCPS) for medication management.   14 July 23: Pt with hx of borderline PD, anxiety, depression, ADHD, AUD, nicotine dependence who returns to clinic reporting \"doing good\" despite high subjective PHQ/GAD7 screeners and chronic SI thoughts without plan/intent. He describes fairly high anxiety and ruminative thoughts. We discussed rec for therapy at last visit; pt says he forgot to follow up on that. We also discussed his mediciation today and he was unable to fill clonidine apparently and PDMP does not show he filled concerta, either. Discussed filling rx and trialing these before next visit in 3 weeks.  Discussed my upcoming transition out of the organization. Reviewed returning to PCP with recommendations for next steps vs referral to long term psychiatry for ongoing psychiatry support. Pt agrees to referral to long term psychiatry; placed today.   He remains chronic elevated risk due to his history and extensive access to firearms (for sport and recreation; will not remove them).   Today 1 Aug 23: Pt with hx of borderline PD, anxiety, depression, ADHD, AUD, nicotine dependence who returns to clinic reporting variable mood. He feels absence of depression now and says this lasts about 3 weeks before he has a depressive episode. He denies significant decreased need for sleep, impulsivity outside his baseline, and has no racing or grandiose thoughts or observed restlessness. While I don't think this is bipolar disorder, it will remain r/o. We discussed his excessive drowsiness in the morning and concern this is citalopram-related. Recommended reducing to 20mg and re-eval at long term appointment or TC interim visit; pt opts to wait for long term appt with [facility] 10/3/23. No other changes to medicine otday. He has not filled Concerta because " the pharmacy didn't call; I advised him it is OK to fill it if he wants to. He endorses chronic SI thoughts without plan/intent. He remains chronic elevated risk due to his history and extensive access to firearms (for sport and recreation; will not remove them).     TREATMENT PLAN: Medication side effects and alternatives reviewed. Health promotion activities recommended and reviewed. All questions addressed. Education and counseling completed regarding risks and benefits of medications and psychotherapy options. Collaborative Care Psychiatry Service model reviewed today. Recommend therapy for additional support. Safety plan reviewed as indicated.     MEDICATIONS:   -decrease CITALOPRAM to 20mg at bedtime  -increase CONCERTA 36mg daily in the morning  -continue CLONIDINE 0.1mg TWICE DAILY     LABS/RADS:   -None at this time    PATIENT STATUS:  CCPS MD/DO/NP/PA providers offer care a specialty service for Primary Care Providers in the [facility] that seek to optimize psychotropic medications for unstable patients.  Once medications have been optimized, our providers discharge the patient back to the referring Primary Care Provider for ongoing medication management.  This type of system allows our providers to serve a high volume of patients.   - panel adrián 3 Oct 23; TC referral for interim care    PSYCHOSOCIAL:   -consider therapy  -Follow up with primary care provider as planned or for acute medical concerns.    PSYCHOEDUCATION:  Medication side effects and alternatives reviewed. Health promotion activities recommended and reviewed today. All questions addressed. Education and counseling completed regarding risks and benefits of medications and psychotherapy options.  Consent provided by patient/guardian  Call the psychiatric nurse line with medication questions or concerns at [phone].  MyChart may be used to communicate with your provider, but this is not intended to be used for emergencies.  BLACK BOX  WARNING: Discussed the Food and Drug Administration (FDA) requires that all antidepressants carry a warning that some children, adolescents and young adults may be at increased risk of suicide when taking antidepressants. Anyone taking an antidepressant should be watched closely for worsening depression or unusual behavior especially in the first few weeks after starting an SSRI. Keep in mind, antidepressants are more likely to reduce suicide risk in the long run by improving mood.   STIMULANT THERAPY: Side effects discussed including but not limited to cardiac (including HTN, tachycardia, sudden death), motor/tic, appetite/growth, mood lability and sleep disruption. This is a controlled substance with risk for abuse, need to keep in a safe keep place and cannot replace lost scripts  Medlineplus.gov is information for patients.  It is run by the [employer] and it contains information about all disorders, diseases and all medications.      FOLLOW-UP: Follow up with TC in 4 weeks    Continue all other treatments (including medications) per primary care provider and/or specialists.   To schedule individual or family therapy, call [facility] at [phone].   Follow up with primary care provider as planned or for acute medical concerns.  Call the psychiatric nurse line with medication questions or concerns at [phone] or [phone].  [employer] may be used to communicate with your care team, but this is not intended to be used for emergencies.    CRISIS RESOURCES:    Present to the Emergency Department as needed or call after hours crisis line at [phone] or [phone].   [place] Crisis Text Line: Text [place] to 960646.  Suicide LifeLine Chat: suicidepreventionlifeline.org/chat/.  National Suicide Prevention Lifeline: 738.771.3851 (TTY: 890.865.2237). Call anytime for help.  (www.suicidepreventionlifeline.org)  National [place] on Mental Illness (www.art.org): 476.780.5832 or  929.188.6671.  Mental Health Association (www.mentalhealth.org): 034-532-3229 or 823-064-5795.      Signed:   Rambo Crum DNP, PMRASHIP-BC  Collaborative Care Psychiatry Service (CCPS)

## 2023-08-01 NOTE — NURSING NOTE
"Is the patient currently in the state of [place]? YES    Visit mode:VIDEO    If the visit is dropped, the patient can be reconnected by: VIDEO VISIT: Text to cell phone: [phone]    Will anyone else be joining the visit? NO      How would you like to obtain your AVS? MyChart    Are changes needed to the allergy or medication list? NO    Reason for visit: RECHECK    Patient stated they started a new job and waiting for insurance to \"kick in\". Patient still wanted to proceed with visit.    Violeta Sanchez VF  "

## 2023-08-02 ENCOUNTER — TELEPHONE (OUTPATIENT)
Dept: FAMILY MEDICINE | Facility: CLINIC | Age: 28
End: 2023-08-02

## 2023-10-02 ASSESSMENT — ANXIETY QUESTIONNAIRES
2. NOT BEING ABLE TO STOP OR CONTROL WORRYING: MORE THAN HALF THE DAYS
GAD7 TOTAL SCORE: 18
4. TROUBLE RELAXING: MORE THAN HALF THE DAYS
7. FEELING AFRAID AS IF SOMETHING AWFUL MIGHT HAPPEN: MORE THAN HALF THE DAYS
1. FEELING NERVOUS, ANXIOUS, OR ON EDGE: NEARLY EVERY DAY
5. BEING SO RESTLESS THAT IT IS HARD TO SIT STILL: NEARLY EVERY DAY
3. WORRYING TOO MUCH ABOUT DIFFERENT THINGS: NEARLY EVERY DAY
6. BECOMING EASILY ANNOYED OR IRRITABLE: NEARLY EVERY DAY
IF YOU CHECKED OFF ANY PROBLEMS ON THIS QUESTIONNAIRE, HOW DIFFICULT HAVE THESE PROBLEMS MADE IT FOR YOU TO DO YOUR WORK, TAKE CARE OF THINGS AT HOME, OR GET ALONG WITH OTHER PEOPLE: SOMEWHAT DIFFICULT
GAD7 TOTAL SCORE: 18

## 2023-10-02 ASSESSMENT — PATIENT HEALTH QUESTIONNAIRE - PHQ9
SUM OF ALL RESPONSES TO PHQ QUESTIONS 1-9: 13
10. IF YOU CHECKED OFF ANY PROBLEMS, HOW DIFFICULT HAVE THESE PROBLEMS MADE IT FOR YOU TO DO YOUR WORK, TAKE CARE OF THINGS AT HOME, OR GET ALONG WITH OTHER PEOPLE: SOMEWHAT DIFFICULT
SUM OF ALL RESPONSES TO PHQ QUESTIONS 1-9: 13

## 2023-10-03 ENCOUNTER — VIRTUAL VISIT (OUTPATIENT)
Dept: PSYCHIATRY | Facility: CLINIC | Age: 28
End: 2023-10-03
Payer: COMMERCIAL

## 2023-10-03 DIAGNOSIS — F33.1 MAJOR DEPRESSIVE DISORDER, RECURRENT EPISODE, MODERATE (H): Primary | ICD-10-CM

## 2023-10-03 DIAGNOSIS — F90.9 ATTENTION DEFICIT HYPERACTIVITY DISORDER (ADHD), UNSPECIFIED ADHD TYPE: ICD-10-CM

## 2023-10-03 DIAGNOSIS — F60.3 BORDERLINE PERSONALITY DISORDER (H): ICD-10-CM

## 2023-10-03 DIAGNOSIS — F17.200 NICOTINE DEPENDENCE, UNCOMPLICATED, UNSPECIFIED NICOTINE PRODUCT TYPE: ICD-10-CM

## 2023-10-03 DIAGNOSIS — F41.1 GAD (GENERALIZED ANXIETY DISORDER): ICD-10-CM

## 2023-10-03 PROCEDURE — 99417 PROLNG OP E/M EACH 15 MIN: CPT | Mod: 95 | Performed by: NURSE PRACTITIONER

## 2023-10-03 PROCEDURE — 99215 OFFICE O/P EST HI 40 MIN: CPT | Mod: 95 | Performed by: NURSE PRACTITIONER

## 2023-10-03 RX ORDER — GUANFACINE 1 MG/1
1 TABLET, EXTENDED RELEASE ORAL AT BEDTIME
Qty: 30 TABLET | Refills: 1 | Status: SHIPPED | OUTPATIENT
Start: 2023-10-03 | End: 2023-11-28

## 2023-10-03 RX ORDER — ARIPIPRAZOLE 2 MG/1
2 TABLET ORAL DAILY
Qty: 30 TABLET | Refills: 1 | Status: SHIPPED | OUTPATIENT
Start: 2023-10-03 | End: 2023-10-31 | Stop reason: DRUGHIGH

## 2023-10-03 NOTE — PATIENT INSTRUCTIONS
"Patient Education   [facility]  What to Expect  Here's what to expect in the [facility].   About the program  You'll be meeting with a psychiatric doctor to check your mental health. A psychiatric doctor helps you deal with troubling thoughts and feelings by giving you medicine. They'll make sure you know the plan for your care. You may see them for a long time. When you're feeling better, they may refer you back to seeing your family doctor.   If you have any questions, we'll be glad to talk to you.  About visits  Be open  At your visits, please talk openly about your problems. It may feel hard, but it's the best way for us to help you.  Cancelling visits  If you can't come to your visit, please call us right away at [phone]. If you don't cancel at least 24 hours (1 full day) before your visit, that's \"late cancellation.\"  Not showing up for your visits  Being very late is the same as not showing up. You'll be a \"no show\" if:  You're more than 15 minutes late for a 30-minute (half hour) visit.  You're more than 30 minutes late for a 60-minute (full hour) visit.  If you cancel late or don't show up 2 times within 6 months, we may end your care.  Getting help between visits  If you need help between visits, you can call us Monday to Friday from 8 a.m. to 4:30 p.m. at [phone].  Emergency care  Call 911 or go to the nearest emergency department if your life or someone else's life is in danger.  Call 988 anytime to reach the national Suicide and Crisis hotline.  Medicine refills  To refill your medicine, call your pharmacy. You can also call [facility] at [phone], Monday to Friday, 8 a.m. to 4:30 p.m. It can take 1 to 3 business days to get a refill.   Forms, letters, and tests  You may have papers to fill out, like FMLA, short-term disability, and workability. We can help you with these forms at your visits, but you must have an " appointment. You may need more than 1 visit for this, to be in an intensive therapy program, or both.  Before we can give you medicine for ADHD, we may refer you to get tested for it or confirm it another way.  We may not be able to give you an emotional support animal letter.  We don't do mental health checks ordered by the court.   We don't do mental health testing, but we can refer you to get tested.   Thank you for choosing us for your care.  For informational purposes only. Not to replace the advice of your health care provider. Copyright   2022 [facility]. All rights reserved. [employer] [id number] - 12/22.       Plan:  1.Patient will take the medications as prescribed.   Medications: Take Abilify 2 mg daily for mood, depression and anxiety  Take Guanfacine 1 mg daily for ADHD and irritability and anger  Patient will not stop taking medications or adjust them without consulting with the provider.  2.Patient will call with any problems between visits.  3.Patient will go to the emergency room if not feeling safe , unable to function in the community, or if suicidal, homicidal or hearing voices or having paranoia.  4.Patient will abstain from drugs and alcohol./Pt denies use .  5.Patient will not drive if sedated on medications or under influence of any substance.   6.Patient will not mix psychiatric medications with drugs and alcohol.   7.Patient will watch his diet and exercise.  8.Patient will see non psychiatric providers for non psychiatric disorders.  9. Next appointment in one  month

## 2023-10-03 NOTE — PROGRESS NOTES
"PSYCHIATRIC DIAGNOSTIC ASSESSMENT      Name:  Alhaji Sanchez  : 1995    Alhaji Sanchez is a 28 year old male who is being evaluated via a billable Video visit.      Telemedicine Visit: The patient's condition can be safely assessed and treated via synchronous audio and visual telemedicine encounter.      Reason for Telemedicine Visit: COVID 19 pandemic and the social and physical recommendations by the CDC and MD., Patient has requested telehealth visit, and Patient unable to travel      Originating Site (Patient Location): Patient's home    Distant Site (Provider Location): [facility] Outpatient Setting: [facility]    Consent:  The patient/guardian has verbally consented to: the potential risks and benefits of telemedicine (video visit or phone) versus in person care; bill my insurance or make self-payment for services provided; and responsibility for payment of non-covered services.     Mode of Communication:  MaxxAthlete platform     As the provider I attest to compliance with applicable laws and regulations related to telemedicine.                                              CHIEF COMPLAINT   \"To look into my medication since they are not working\"    HISTORY OF PRESENT ILLNESS     Patient is a 28 year old,  White Not  or  male with a history of major depressive disorder, recurrent episode, moderate, ADHD, unspecified ADHD type, generalized anxiety disorder, borderline personality disorder, and nicotine dependence, uncomplicated, unspecified nicotine product type who presents for initial psychiatric evaluation to establish long-term psychiatric care for the medication management of ongoing symptoms related to increased anxiety, worsening depression and ADHD following referral by Kaela Crum CNP.  Patient stated he saw Kaela Crum CNP at the [facility] few months ago who adjusted her medication and started her on clonidine for the management of increased anxiety.  " "Patient states that he stopped taking all his medications few weeks ago partly due to running out of medication and partly due to poor tolerability.  Patient reports citalopram,clonidine and concerta make him tired and drowsy all the time.  Patient stated he continues to struggle with low motivation, increased irritability, poor anger management, feeling on edge, and increased anxiety despite taking this medication.  Patient reports he is open to trying new medication with less side effects.  Patient endorsed history of several psychiatric hospitalization as well as multiple suicide attempts with most recently in 2019 at [facility].  Patient denies history of psychosis, karen or hypomania.  Patient endorsed history of several for psychotropic trial.  PSYCHIATRIC HISTORY:   Previous psychiatry: Hx psychiatry for many years through [facility] last 2019  Previous therapist: Hx therapy through [facility] last 2019  History of Psychiatric Hospitalizations:   - Inpatient: 2010 x 3w for HI; 2018 for SI; says 5 inpt hosp. Between [id number]  - IOP/PHP/Day treatment: DBT in remote past, not helpful. PHP 2017  History of Suicidal Ideation: chronic SI; no plan or intent today  History of Suicide Attempts: per record   \"Reports numerous suicide attempts including \"trying to hang myself, choking myself out, laying in front of the railroad tracks, jumping off a bridge\"\"  History of Self-injurious Behavior: cutting  History of impulsivity: Yes   History of Violence/Aggression: HI in 2010 and subsequent 3w hospitalization.   History of Commitment? No   Electroconvulsive Therapy (ECT) or Transcranial Magnetic Stimulation (TMS): No   PharmacogenomicTesting (such as GeneSight): No    PSYCHIATRIC REVIEW OF SYSTEMS:   Psychiatric Review of Systems:   Depression:   Reports: depressed mood, passive suicidal ideation, decreased interest, changes in sleep, changes in appetite, guilt, hopelessness, helplessness, impaired concentration, decreased " energy, irritability.  Christi:   Denies: sleeplessness, increased goal-directed activities, abrupt increase in energy pressured speech  Psychosis:   Denies: visual hallucinations, auditory hallucinations, paranoia  Anxiety:   Reports: excessive worries that are difficult to control, panic attacks  PTSD:   Denies: re-experiencing past trauma, nightmares, increased arousal, avoidance of traumatic stimuli, impaired function.  OCD:   Denies: obsessions, checking, symmetry, cleaning, skin picking.  Eating Disorder:   Denies: restriction, binging, purging.    Sleep: Denies       MEDICATIONS                                                                                                Current Outpatient Medications   Medication Sig    citalopram (CELEXA) 20 MG tablet Take 1 tablet (20 mg) by mouth daily    cloNIDine (CATAPRES) 0.1 MG tablet Take 1 tablet (0.1 mg) by mouth 2 times daily for 90 days    methylphenidate HCl ER (CONCERTA) 36 MG CR tablet Take 1 tablet (36 mg) by mouth every morning     No current facility-administered medications for this visit.       DRUG MONITORING:  [place] Prescription Monitoring Program evaluating controlled substances in the last year in [place]:  The [place] Prescription Monitoring Program has been reviewed and there are no current concerns with: diversionary activity, early refill requests, and or obtaining the medication from multiple providers       PAST PSYCHOTROPIC MEDICATIONS:  Lithium  Quetiapine  Lorazepam  Adderall - low appetite  Amitriptyline  Escitalopram  Hydroxyzine  Duloxetine  Sertraline  Vyvanse  Lurasidone  Trazodone  Desvenlafaxine  Bupropion   Haloperidol  Lamotrigine - caused anger  Propranolol - for headaches, not sure if it helped for anxiety    VITALS   There were no vitals taken for this visit.     BP Readings from Last 1 Encounters:   05/19/23 123/75     Pulse Readings from Last 1 Encounters:   05/19/23 75     Wt Readings from Last 1 Encounters:   05/19/23 72.6 kg  "(160 lb)     Ht Readings from Last 1 Encounters:   05/19/23 1.74 m (5' 8.5\")     Estimated body mass index is 23.97 kg/m  as calculated from the following:    Height as of 5/19/23: 1.74 m (5' 8.5\").    Weight as of 5/19/23: 72.6 kg (160 lb).      PERTINENT HISTORY   PAST MEDICAL HISTORY:   Past Medical History:   Diagnosis Date    Depressive disorder     Katelyn been dealing with depression  and anxiety.and adhd sience i was 8 or 9. I was also diagnosed  with borderline  personality disorder back in 2018       PAST SURGICAL HISTORY: No past surgical history on file.    FAMILY HISTORY:   Family History   Problem Relation Age of Onset    Depression Mother     Anxiety Disorder Mother     Thyroid Disease Maternal Grandmother     Asthma Sister        SOCIAL HISTORY:   Social History     Tobacco Use    Smoking status: Every Day     Types: Dip, chew, snus or snuff, Other     Passive exposure: Never    Smokeless tobacco: Current     Types: Chew   Substance Use Topics    Alcohol use: Yes         Seizures or Head Injury: Denies history of head injury. Denies history of seizures.  History of cardiac disease, rheumatic fever, fainting or dizziness, especially with exercise, seizures, chest pain or shortness of breath with exercise, unexplained change in exercise tolerance, palpitations, high blood pressure, or heart murmur?   No    LABS & IMAGING                                                                                                                Personally reviewed  Recent Labs   Lab Test 04/13/23  1002   WBC 7.7   HGB 16.1   HCT 46.4   MCV 84        Recent Labs   Lab Test 04/13/23  1002      POTASSIUM 4.2   CHLORIDE 110*   CO2 28   GLC 99   FRANCES 9.0   BUN 12   CR 1.01   GFRESTIMATED >90   ALBUMIN 4.4   PROTTOTAL 7.5   AST 19   ALT 24   ALKPHOS 75   BILITOTAL 0.6     No lab results found.  Recent Labs   Lab Test 04/13/23  1002   TSH 1.53     No results found for: SOF444, IRVP641, [id number], VITD3, D2VIT, " D3VIT, DTOT, [id number], [id number], [id number], [id number], [id number], [id number]     ALLERGY & IMMUNIZATIONS       Allergies   Allergen Reactions    Lurasidone Other (See Comments)    Mold      Other reaction(s): Runny Nose    Neomycin Unknown     burning in ears- ear drops       FAMILY MEDICAL HISTORY:     Family History       Problem (# of Occurrences) Relation (Name,Age of Onset)    Anxiety Disorder (1) Mother (Neha)    Asthma (1) Sister (Ave)    Depression (1) Mother (Neha)    Thyroid Disease (1) Maternal Grandmother (Cherry)              Family history of sudden or unexplained death or an event requiring resuscitation in children or young adults, cardiac arrhythmias (eg, Reza-Parkinson-White syndrome), long QT syndrome, catecholaminergic paroxysmal ventricular tachycardia, Brugada syndrome, arrhythmogenic right ventricular dysplasia, hypertrophic cardiomyopathy, dilated cardiomyopathy, or Marfan syndrome?  No    FAMILY PSYCHIATRIC HISTORY:   Psychiatry:Mom struggle with depression and anxiety, and sister deals with anxiety  Substance use history in family: Negative  Family suicide history:Mother attempted suicide      SIGNIFICANT SOCIAL/FAMILY HISTORY:                                           Born and raised in: [place]  Relationship status: Single, has a girlfriend, living together   Children: 2 kids of her girlfriend     Highest education level was:Some college   Service:Denies  Employment status: Works full time  LEGAL:Denies     SUBSTANCE USE HISTORY    Tobacco use: E-cigarette, and chew tobacco   Caffeine: 2 Cups of coffee in the morning and energy drinks, and some pop  Current alcohol:   A few beers per night. Hx alcohol abuse.    Current substance use: denies  Past use alcohol/substance use:cannabis last 2018, amphetamines rx last 2020, hallucinogens in the past, benzos last rx 2019       MEDICAL REVIEW OF SYSTEMS:   Ten system review was completed with pertinent positives noted  "above    MENTAL STATUS EXAM:   Mental Status Examination (limited due to video virtual visit format):  Vital Signs: There were no vitals taken for this virtual visit.  Appearance: adequately groomed, appears stated age, and in no apparent distress.  Attitude: cooperative   Eye Contact: good to the extent that can be determined in a video visit  Muscle Strength and Tone: no gross abnormalities based on remote observation  Psychomotor Behavior:  no evidence of tardive dyskinesia, dystonia, or tics based on remote observation  Gait and Station: normal, no gross abnormalities based on remote observation  Speech: clear, coherent, normal prosody, regular rate, regular rhythm and fluent  Associations: No loosening of associations  Thought Process: coherent and goal directed  Thought Content: no evidence of suicidal ideation or homicidal ideation, no evidence of psychotic thought, no auditory hallucinations present and no visual hallucinations present  Mood: \"irritable\"  Affect: appropriate and in normal range  Insight: good  Judgment: intact, adequate for safety  Impulse Control: intact  Oriented to: time, place, person and situation  Attention Span and Concentration: normal  Language: Intact  Recent and Remote Memory: intact to interview. Not formally assessed. No amnesia.  Fund of Knowledge: appropriate        SAFETY   Feels safe in home: Yes   Suicidal ideation: Denies  History of suicide attempts:  No   Hx of impulsivity: No     RISK AND PROTECTIVE FACTORS     Static Risk Factors: sex, prior attempts, serious mental illness, history of MH diagnoses and/or treatment, history of hospitalization and impulsivity  Firearms/Weapons Access: Yes pt has \"a lot of firearms.\" Going to the range and hunting are \"coping mechanisms. I love hunting.\" Says he would never shoot himself because \"I don't want anyone to clean that up.\"   Dynamic Risk Factors: SI, emotional distress, substance use, anxiety, dramatic changes in mood, access " to means and mental health stigma     Protective Factors: hope for the future, compliance with medication, future oriented, healthy intimate relationships, resilience, perceived internal locus of control, social support, access to care as needed, motivation and readiness for change, reasons for living and displaying help seeking behavior    DSM 5 DIAGNOSIS:   1. Attention deficit hyperactivity disorder (ADHD), unspecified ADHD type    2. KADEN (generalized anxiety disorder)    3. Major depressive disorder, recurrent episode, moderate (H)    4. Borderline personality disorder (H)    5. Nicotine dependence, uncomplicated, unspecified nicotine product type      ASSESSMENT AND PLAN    Patient is a 28 year old,  White Not  or  male with a history of major depressive disorder, recurrent episode, moderate, ADHD, unspecified ADHD type, generalized anxiety disorder, borderline personality disorder, and nicotine dependence, uncomplicated, unspecified nicotine product type who presents for initial psychiatric evaluation to establish long-term psychiatric care for the medication management of ongoing symptoms related to increased anxiety, worsening depression and ADHD following referral by Kaela Crum CNP.  Patient with history of several psychiatric hospitalization and multiple suicidal attempts with most recently in 2019 has recently stopped taking all his medications due to running out of them as well as poor tolerability.  He was previously on Concerta, Celexa, clonidine which patient stopped taking at the same time as he complained medication makes him tired and drowsy all the time.  He has had several failed psychotropic trial with poor efficacy and tolerability.  He has been struggling with increased irritability, low frustration tolerance, poor anger management, feeling on the edge as well as increased anxiety.  Psychosocial stressor related to girlfriend's recent pregnancy miscarriage of their twin  exacerbated symptoms.  He is open to trying new medication with less side effects.  He had tried several stimulants for the management of ADHD in the past with poor tolerability and efficacy.  Though he states that he continued to take his Concerta regularly until few weeks ago, PDMP record indicates patient has not refilled this medication since May of this year.  I suggested starting patient on Abilify 2 mg to effectively manage mood and emotional reactivity as well as anxiety.  I also suggested starting patient on guanfacine 1 mg to further help with ADHD symptoms.  I discussed both medication side effects, risk, and benefit with the patient.  Patient verbalized good understanding and he was amenable to taking both Abilify and guanfacine to help with ongoing symptoms.  Patient endorsed chronic passive SI but denies intent or plan.  Patient denies SI and HI.  Patient denies both auditory and visual hallucination.  Patient report normal no hypomania.  Patient return to clinic in 4 weeks for follow-up.    Plan:  1.Patient will take the medications as prescribed.   Medications: Take Abilify 2 mg daily for mood, depression and anxiety  Take Guanfacine 1 mg daily for ADHD and irritability and anger  Patient will not stop taking medications or adjust them without consulting with the provider.  2.Patient will call with any problems between visits.  3.Patient will go to the emergency room if not feeling safe , unable to function in the community, or if suicidal, homicidal or hearing voices or having paranoia.  4.Patient will abstain from drugs and alcohol./Pt denies use .  5.Patient will not drive if sedated on medications or under influence of any substance.   6.Patient will not mix psychiatric medications with drugs and alcohol.   7.Patient will watch his diet and exercise.  8.Patient will see non psychiatric providers for non psychiatric disorders.  9. Next appointment in one  month     Risk Assessment:     Alhaji has  notable risk factors for self-harm, including, single status, anxiety, chronic SI and hx of suicide attempts and passive SI. However, risk is mitigated by ability to volunteer a safety plan and history of seeking help when needed. Additional steps taken to minimize risk include making medication adjustment, asking patient to call 911 and go to the ER if not able to stay safe at home,  Therefore, based on all available evidence including the factors cited above, Alhaji does not appear to be an imminent danger to self or others and does not meet criteria 72 hour hold. However, if patient uses substances or is non-adherent with medication, their risk of decompensation and SI/HI will be elevated. This was discussed with the patient as she verbalized good understanding.   CONSULTS/REFERRALS:   Continue therapy  None at this time  Coordinate care with therapist as needed    MEDICAL:   None at this time  Coordinate care with PCP (Gerry Vuong) as needed  Follow up with primary care provider as planned or for acute medical concerns.    PSYCHOEDUCATION:  Medication side effects and alternatives reviewed. Health promotion activities recommended and reviewed today. All questions addressed. Education and counseling completed regarding risks and benefits of medications and psychotherapy options.  Consent provided by patient/guardian  Call the psychiatric nurse line with medication questions or concerns at [phone].  Comic Replyhart may be used to communicate with your provider, but this is not intended to be used for emergencies.  BLACK BOX WARNING: Discussed the Food and Drug Administration (FDA) requires that all antidepressants carry a warning that some children, adolescents and young adults may be at increased risk of suicide when taking antidepressants. Anyone taking an antidepressant should be watched closely for worsening depression or unusual behavior especially in the first few weeks after starting an SSRI. Keep in mind,  antidepressants are more likely to reduce suicide risk in the long run by improving mood.   SEROTONIN SYNDROME:  Discussed risks of Serotonin syndrome (ie, serotonin toxicity) which is a potentially life-threatening condition associated with increased serotonergic activity in the central nervous system (CNS). It is seen with therapeutic medication use, inadvertent interactions between drugs, and intentional self-poisoning. Serotonin syndrome may involve a spectrum of clinical findings, which often include mental status changes, autonomic hyperactivity, and neuromuscular abnormalities.    BENZODIAZEPINE:  discussion on how benzos work and the need to use them short term due to potential of anxiety getting.  This is a controlled substance with risk for abuse, need to keep in a safe keep place and cannot replace lost scripts.    HYPNOTIC USE: Hypnotic use, risk for CNS depression, sleep-walking, not to mix with ETOH or other CNS depressant, need for six hours of sleep, stop if change in mood.  This is a controlled substance with risk for abuse, need to keep in a safe keep place and cannot replace lost scripts.  FIRST GENERATION ANTIPSYCHOTIC/ SECOND GENERATION ANTIPSYCHOTIC USE:  Atypical need for cardiometabolic monitoring with medication- B/P, weight, blood sugar, cholesterol.  Need to monitor for abnormal movements taught  SAFETY:  We all care about your loved one's safety. To reduce the risk of self-harm, remove access to all:  Firearms, Medicines (both prescribed and over-the-counter), Knives and other sharp objects, Ropes and like materials, and Alcohol  SLEEP HYGIENE: establish a sleep routine, limit screen time 1 hour prior to bed, use bed for sleep only, take sleep/medications on time (including sleepy time tea, trazadone or herbal treatments such as melatonin), aroma therapy, limit caffeine/sugar, yoga, guided imagery, stretch, meditation, limit naps to 20 minutes, make a temperature change in the room, white  noise, be mindful of slowing down breathing, take a warm bath/shower, frequently wash sheets, and journaling.   Medlineplus.gov is information for patients.  It is run by the National Library of Medicine and it contains information about all disorders, diseases and all medications.      COMMUNITY RESOURCES:    CRISIS NUMBERS: Provided in AVS 10/3/2023  National Suicide Prevention Lifeline: 9-078-567-TALK (296-604-2764)  Hyperpot/resources for a list of additional resources (SOS)            [facility] - [phone]   Urgent Care Adult Mental Mkhpcj-[phone] mobile unit/  crisis line  [facility] -[phone]   COPE  [place] Mobile Team -[phone] (adults)/ [phone] (child)  Poison Control Center - 1-916.140.3124    OR  go to nearest ER  Crisis Text Line for any crisis  send this-   To: 753918   [facility]  [phone]  National Suicide Prevention Lifeline: 670.260.3076 (TTY: 705.126.6617). Call anytime for help.  (www.suicidepreventionlifeline.org)  National [employer] on Mental Illness (www.art.org): 986.568.7567 or 344-629-4828.   Mental Health Association (www.mentalhealth.org): 593.947.6491 or 353-689-1871.  [place] Crisis Text Line: Text [place] to 357006  Suicide LifeLine Chat: suicidepreFat Span[employer] Technologiesline.org/chat    ADMINISTRATIVE BILLIN spent interviewing patient, reviewing referral documents, obtaining and reviewing outside records, communication with other health specialists, and preparing this report on this day: 10/3/23    Video/Phone Start Time:  3:05 pm  Video/Phone End Time:   3:50 pm    Greater than 50% of time was spent in counseling and coordination of care regarding above diagnoses and treatment plan.    Patient Status:  Our psychiatry providers act as a specialty service for Primary Care Providers in the [facility] that seek to optimize medications for unstable patients.  Once medications  have been optimized, our providers discharge the patient back to the referring Primary Care Provider for ongoing medication management.  This type of system allows our providers to serve a high volume of patients. At this time  Patient will continue to be seen for ongoing consultation and stabilization.    Signed:   Boyd Johnson, MSN, APRN, PMHNP-BC  Long Term Outpatient Psychiatry  Chart documentation done in part with Dragon Voice Recognition software.  Although reviewed after completion, some word and grammatical errors may remain. Answers submitted by the patient for this visit:  Patient Health Questionnaire (Submitted on 10/2/2023)  If you checked off any problems, how difficult have these problems made it for you to do your work, take care of things at home, or get along with other people?: Somewhat difficult  PHQ9 TOTAL SCORE: 13  KADEN-7 (Submitted on 10/2/2023)  KADEN 7 TOTAL SCORE: 18

## 2023-10-03 NOTE — PROGRESS NOTES
Virtual Visit Details    Type of service:  Video Visit     Originating Location (pt. Location): Home    Distant Location (provider location):  Off-site  Platform used for Video Visit: Selero        Answers submitted by the patient for this visit:  Patient Health Questionnaire (Submitted on 10/2/2023)  If you checked off any problems, how difficult have these problems made it for you to do your work, take care of things at home, or get along with other people?: Somewhat difficult  PHQ9 TOTAL SCORE: 13  KADEN-7 (Submitted on 10/2/2023)  KADEN 7 TOTAL SCORE: 18

## 2023-10-03 NOTE — NURSING NOTE
Is the patient currently in the state of [place]? YES    Visit mode:VIDEO    If the visit is dropped, the patient can be reconnected by: VIDEO VISIT: Text to cell phone:   Telephone Information:   Mobile [phone]       Will anyone else be joining the visit? No  (If patient encounters technical issues they should call [phone])    How would you like to obtain your AVS? MyChart    Are changes needed to the allergy or medication list? Yes Pt reported not taking any medications currently because they've ran out of all of their medications.    Rooming Documentation: Assigned questionnaire(s) completed .    Reason for visit: Consult     CEASAR Jimenez

## 2023-10-31 ENCOUNTER — VIRTUAL VISIT (OUTPATIENT)
Dept: PSYCHIATRY | Facility: CLINIC | Age: 28
End: 2023-10-31
Payer: COMMERCIAL

## 2023-10-31 DIAGNOSIS — F60.3 BORDERLINE PERSONALITY DISORDER (H): ICD-10-CM

## 2023-10-31 DIAGNOSIS — F33.1 MAJOR DEPRESSIVE DISORDER, RECURRENT EPISODE, MODERATE (H): Primary | ICD-10-CM

## 2023-10-31 DIAGNOSIS — F90.9 ATTENTION DEFICIT HYPERACTIVITY DISORDER (ADHD), UNSPECIFIED ADHD TYPE: ICD-10-CM

## 2023-10-31 DIAGNOSIS — F41.1 GAD (GENERALIZED ANXIETY DISORDER): ICD-10-CM

## 2023-10-31 DIAGNOSIS — F17.200 NICOTINE DEPENDENCE, UNCOMPLICATED, UNSPECIFIED NICOTINE PRODUCT TYPE: ICD-10-CM

## 2023-10-31 PROCEDURE — 99213 OFFICE O/P EST LOW 20 MIN: CPT | Mod: VID | Performed by: NURSE PRACTITIONER

## 2023-10-31 RX ORDER — ARIPIPRAZOLE 5 MG/1
5 TABLET ORAL DAILY
Qty: 30 TABLET | Refills: 1 | Status: SHIPPED | OUTPATIENT
Start: 2023-10-31

## 2023-10-31 ASSESSMENT — PAIN SCALES - GENERAL: PAINLEVEL: NO PAIN (0)

## 2023-10-31 NOTE — PROGRESS NOTES
"PSYCHIATRIC PROGRESS NOTE     Name:  Alhaji Sanchez  : 1995    Alhaji Sanchez is a 28 year old male who is being evaluated via a billable Video visit.      Telemedicine Visit: The patient's condition can be safely assessed and treated via synchronous audio and visual telemedicine encounter.      Reason for Telemedicine Visit: COVID 19 pandemic and the social and physical recommendations by the CDC and MD., Patient has requested telehealth visit, and Patient unable to travel      Originating Site (Patient Location): Patient's home    Distant Site (Provider Location): [facility] Outpatient Setting: [facility]    Consent:  The patient/guardian has verbally consented to: the potential risks and benefits of telemedicine (video visit or phone) versus in person care; bill my insurance or make self-payment for services provided; and responsibility for payment of non-covered services.     Mode of Communication:  [employer] platform     As the provider I attest to compliance with applicable laws and regulations related to telemedicine.     Date of last Visit: 10/03/23                                         CHIEF COMPLAINT   \"Doing better\"    HISTORY OF PRESENT ILLNESS     Patient who was last seen in the clinic on 10/3/23 returned today for follow up visit.  Patient reports he has noticed little improvement in mood, depression, and ADHD symptoms since taking Abilify and guanfacine.  Patient stated he does notice increased anxiety and inability to rest well during the night since taking this medication.  Overall, patient reports he is still able to tolerate these side effects.  Patient also reports he still exhibits poor impulse control although with decreased intensity and frequency.  Patient reports that he will be having two job interviews this week I suggested titrating Abilify to 5 mg daily to continue helping with impulsivity, mood and anxiety.  I encouraged patient to start taking medication " "in the morning as it against taking it at bedtime as medication might too activating for him during the night.  Patient verbalized good understanding and was amenable to taking Abilify 5 mg daily in the morning.  Patient currently denies suicidal homicidal ideation.  He also denied both auditory and visual hallucination.  Patient report normal no hypomania.  Patient return to clinic in 4 weeks for follow-up.  PSYCHIATRIC HISTORY:   Previous psychiatry: Hx psychiatry for many years through [facility] last 2019  Previous therapist: Hx therapy through [facility] last 2019  History of Psychiatric Hospitalizations:   - Inpatient: 2010 x 3w for HI; 2018 for SI; says 5 inpt hosp. Between 7437-7683  - IOP/PHP/Day treatment: DBT in remote past, not helpful. PHP 2017  History of Suicidal Ideation: chronic SI; no plan or intent today  History of Suicide Attempts: per record   \"Reports numerous suicide attempts including \"trying to hang myself, choking myself out, laying in front of the railroad tracks, jumping off a bridge\"\"  History of Self-injurious Behavior: cutting  History of impulsivity: Yes   History of Violence/Aggression: HI in 2010 and subsequent 3w hospitalization.   History of Commitment? No   Electroconvulsive Therapy (ECT) or Transcranial Magnetic Stimulation (TMS): No   PharmacogenomicTesting (such as [facility]): No    PSYCHIATRIC REVIEW OF SYSTEMS:   Psychiatric Review of Systems:   Depression:   Reports: depressed mood, passive suicidal ideation, decreased interest, changes in sleep, changes in appetite, guilt, hopelessness, helplessness, impaired concentration, decreased energy, irritability.  Christi:   Denies: sleeplessness, increased goal-directed activities, abrupt increase in energy pressured speech  Psychosis:   Denies: visual hallucinations, auditory hallucinations, paranoia  Anxiety:   Reports: excessive worries that are difficult to control, panic attacks  PTSD:   Denies: re-experiencing past trauma, " nightmares, increased arousal, avoidance of traumatic stimuli, impaired function.  OCD:   Denies: obsessions, checking, symmetry, cleaning, skin picking.  Eating Disorder:   Denies: restriction, binging, purging.    Sleep: Denies       MEDICATIONS                                                                                                Current Outpatient Medications   Medication Sig    ARIPiprazole (ABILIFY) 2 MG tablet Take 1 tablet (2 mg) by mouth daily    citalopram (CELEXA) 20 MG tablet Take 1 tablet (20 mg) by mouth daily (Patient not taking: Reported on 10/3/2023)    cloNIDine (CATAPRES) 0.1 MG tablet Take 1 tablet (0.1 mg) by mouth 2 times daily for 90 days (Patient not taking: Reported on 10/3/2023)    guanFACINE (INTUNIV) 1 MG TB24 24 hr tablet Take 1 tablet (1 mg) by mouth At Bedtime    methylphenidate HCl ER (CONCERTA) 36 MG CR tablet Take 1 tablet (36 mg) by mouth every morning (Patient not taking: Reported on 10/3/2023)     No current facility-administered medications for this visit.       DRUG MONITORING:  [place] Prescription Monitoring Program evaluating controlled substances in the last year in [place]:  The [place] Prescription Monitoring Program has been reviewed and there are no current concerns with: diversionary activity, early refill requests, and or obtaining the medication from multiple providers       PAST PSYCHOTROPIC MEDICATIONS:  Lithium  Quetiapine  Lorazepam  Adderall - low appetite  Amitriptyline  Escitalopram  Hydroxyzine  Duloxetine  Sertraline  Vyvanse  Lurasidone  Trazodone  Desvenlafaxine  Bupropion   Haloperidol  Lamotrigine - caused anger  Propranolol - for headaches, not sure if it helped for anxiety    VITALS   There were no vitals taken for this visit.     BP Readings from Last 1 Encounters:   05/19/23 123/75     Pulse Readings from Last 1 Encounters:   05/19/23 75     Wt Readings from Last 1 Encounters:   05/19/23 72.6 kg (160 lb)     Ht Readings from Last 1 Encounters:  "  05/19/23 1.74 m (5' 8.5\")     Estimated body mass index is 23.97 kg/m  as calculated from the following:    Height as of 5/19/23: 1.74 m (5' 8.5\").    Weight as of 5/19/23: 72.6 kg (160 lb).      PERTINENT HISTORY   PAST MEDICAL HISTORY:   Past Medical History:   Diagnosis Date    Depressive disorder     Katelyn been dealing with depression  and anxiety.and adhd sience i was 8 or 9. I was also diagnosed  with borderline  personality disorder back in 2018       PAST SURGICAL HISTORY: No past surgical history on file.    FAMILY HISTORY:   Family History   Problem Relation Age of Onset    Depression Mother     Anxiety Disorder Mother     Thyroid Disease Maternal Grandmother     Asthma Sister        SOCIAL HISTORY:   Social History     Tobacco Use    Smoking status: Every Day     Types: Dip, chew, snus or snuff, Other     Passive exposure: Never    Smokeless tobacco: Current     Types: Chew   Substance Use Topics    Alcohol use: Yes         Seizures or Head Injury: Denies history of head injury. Denies history of seizures.  History of cardiac disease, rheumatic fever, fainting or dizziness, especially with exercise, seizures, chest pain or shortness of breath with exercise, unexplained change in exercise tolerance, palpitations, high blood pressure, or heart murmur?   No    LABS & IMAGING                                                                                                                Personally reviewed  Recent Labs   Lab Test 04/13/23  1002   WBC 7.7   HGB 16.1   HCT 46.4   MCV 84        Recent Labs   Lab Test 04/13/23  1002      POTASSIUM 4.2   CHLORIDE 110*   CO2 28   GLC 99   FRANCES 9.0   BUN 12   CR 1.01   GFRESTIMATED >90   ALBUMIN 4.4   PROTTOTAL 7.5   AST 19   ALT 24   ALKPHOS 75   BILITOTAL 0.6     No lab results found.  Recent Labs   Lab Test 04/13/23  1002   TSH 1.53     No results found for: \"LQU347\", \"ZWOR660\", \"AVUC04OCARO\", \"VITD3\", \"D2VIT\", \"D3VIT\", \"DTOT\", \"[id number]\", " "\"[id number]\", \"[id number]\", \"[id number]\", \"[id number]\", \"[id number]\"     ALLERGY & IMMUNIZATIONS       Allergies   Allergen Reactions    Lurasidone Other (See Comments)    Mold      Other reaction(s): Runny Nose    Neomycin Unknown     burning in ears- ear drops       FAMILY MEDICAL HISTORY:     Family History       Problem (# of Occurrences) Relation (Name,Age of Onset)    Anxiety Disorder (1) Mother (Neha)    Asthma (1) Sister (Ave)    Depression (1) Mother (Neha)    Thyroid Disease (1) Maternal Grandmother (Cherry)              Family history of sudden or unexplained death or an event requiring resuscitation in children or young adults, cardiac arrhythmias (eg, Reza-Parkinson-White syndrome), long QT syndrome, catecholaminergic paroxysmal ventricular tachycardia, Brugada syndrome, arrhythmogenic right ventricular dysplasia, hypertrophic cardiomyopathy, dilated cardiomyopathy, or Marfan syndrome?  No    FAMILY PSYCHIATRIC HISTORY:   Psychiatry:Mom struggle with depression and anxiety, and sister deals with anxiety  Substance use history in family: Negative  Family suicide history:Mother attempted suicide      SIGNIFICANT SOCIAL/FAMILY HISTORY:                                           Born and raised in: [place]  Relationship status: Single, has a girlfriend, living together   Children: 2 kids of her girlfriend     Highest education level was:Some college   Service:Denies  Employment status: Works full time  LEGAL:Denies     SUBSTANCE USE HISTORY    Tobacco use: E-cigarette, and chew tobacco   Caffeine: 2 Cups of coffee in the morning and energy drinks, and some pop  Current alcohol:   A few beers per night. Hx alcohol abuse.    Current substance use: denies  Past use alcohol/substance use:cannabis last 2018, amphetamines rx last 2020, hallucinogens in the past, benzos last rx 2019       MEDICAL REVIEW OF SYSTEMS:   Ten system review was completed with pertinent positives noted above    MENTAL " "STATUS EXAM:   Mental Status Examination (limited due to video virtual visit format):  Vital Signs: There were no vitals taken for this virtual visit.  Appearance: adequately groomed, appears stated age, and in no apparent distress.  Attitude: cooperative   Eye Contact: good to the extent that can be determined in a video visit  Muscle Strength and Tone: no gross abnormalities based on remote observation  Psychomotor Behavior:  no evidence of tardive dyskinesia, dystonia, or tics based on remote observation  Gait and Station: normal, no gross abnormalities based on remote observation  Speech: clear, coherent, normal prosody, regular rate, regular rhythm and fluent  Associations: No loosening of associations  Thought Process: coherent and goal directed  Thought Content: no evidence of suicidal ideation or homicidal ideation, no evidence of psychotic thought, no auditory hallucinations present and no visual hallucinations present  Mood: \"irritable\"  Affect: appropriate and in normal range  Insight: good  Judgment: intact, adequate for safety  Impulse Control: intact  Oriented to: time, place, person and situation  Attention Span and Concentration: normal  Language: Intact  Recent and Remote Memory: intact to interview. Not formally assessed. No amnesia.  Fund of Knowledge: appropriate        SAFETY   Feels safe in home: Yes   Suicidal ideation: Denies  History of suicide attempts:  No   Hx of impulsivity: No     RISK AND PROTECTIVE FACTORS     Static Risk Factors: sex, prior attempts, serious mental illness, history of MH diagnoses and/or treatment, history of hospitalization and impulsivity  Firearms/Weapons Access: Yes pt has \"a lot of firearms.\" Going to the range and hunting are \"coping mechanisms. I love hunting.\" Says he would never shoot himself because \"I don't want anyone to clean that up.\"   Dynamic Risk Factors: SI, emotional distress, substance use, anxiety, dramatic changes in mood, access to means and " mental health stigma     Protective Factors: hope for the future, compliance with medication, future oriented, healthy intimate relationships, resilience, perceived internal locus of control, social support, access to care as needed, motivation and readiness for change, reasons for living and displaying help seeking behavior    DSM 5 DIAGNOSIS:   1. Attention deficit hyperactivity disorder (ADHD), unspecified ADHD type    2. KADEN (generalized anxiety disorder)    3. Major depressive disorder, recurrent episode, moderate (H)    4. Borderline personality disorder (H)    5. Nicotine dependence, uncomplicated, unspecified nicotine product type      ASSESSMENT AND PLAN    Patient is a 28 year old,  White Not  or  male with a history of major depressive disorder, recurrent episode, moderate, ADHD, unspecified ADHD type, generalized anxiety disorder, borderline personality disorder, and nicotine dependence, uncomplicated, unspecified nicotine product type who presents for follow up visit.  Patient is currently in the process of getting a new job.  He is having two job interviews  this week.  Today, apart from anxiety he is able to tolerate Abilify and guanfacine for mood, depression and ADHD.  Taking Abilify at bedtime it appears to be too activating for him, hence I encouraged him to start taking medication in the morning.  I titrated Abilify to 5 mg to help with severe impulsivity as well as mood and emotional activities.  Patient denies SI and HI.  Patient denies both auditory and visual hallucination.  Patient report normal no hypomania.  Patient return to clinic in 4 weeks for follow-up.    Plan:  1.Patient will take the medications as prescribed.   Medications: Take Abilify 5 mg daily for mood, depression and anxiety  Continue Guanfacine 1 mg daily for ADHD and irritability and anger  Patient will not stop taking medications or adjust them without consulting with the provider.  2.Patient will call with  any problems between visits.  3.Patient will go to the emergency room if not feeling safe , unable to function in the community, or if suicidal, homicidal or hearing voices or having paranoia.  4.Patient will abstain from drugs and alcohol./Pt denies use .  5.Patient will not drive if sedated on medications or under influence of any substance.   6.Patient will not mix psychiatric medications with drugs and alcohol.   7.Patient will watch his diet and exercise.  8.Patient will see non psychiatric providers for non psychiatric disorders.  9. Next appointment in one  month     Risk Assessment:     Alhaji has notable risk factors for self-harm, including, single status, anxiety, chronic SI and hx of suicide attempts and passive SI. However, risk is mitigated by ability to volunteer a safety plan and history of seeking help when needed. Additional steps taken to minimize risk include making medication adjustment, asking patient to call 911 and go to the ER if not able to stay safe at home,  Therefore, based on all available evidence including the factors cited above, Alhaji does not appear to be an imminent danger to self or others and does not meet criteria 72 hour hold. However, if patient uses substances or is non-adherent with medication, their risk of decompensation and SI/HI will be elevated. This was discussed with the patient as she verbalized good understanding.   CONSULTS/REFERRALS:   Continue therapy  None at this time  Coordinate care with therapist as needed    MEDICAL:   None at this time  Coordinate care with PCP (Gerry Vuong) as needed  Follow up with primary care provider as planned or for acute medical concerns.    PSYCHOEDUCATION:  Medication side effects and alternatives reviewed. Health promotion activities recommended and reviewed today. All questions addressed. Education and counseling completed regarding risks and benefits of medications and psychotherapy options.  Consent provided by  patient/guardian  Call the psychiatric nurse line with medication questions or concerns at [phone].  Paperless Posthart may be used to communicate with your provider, but this is not intended to be used for emergencies.  BLACK BOX WARNING: Discussed the Food and Drug Administration (FDA) requires that all antidepressants carry a warning that some children, adolescents and young adults may be at increased risk of suicide when taking antidepressants. Anyone taking an antidepressant should be watched closely for worsening depression or unusual behavior especially in the first few weeks after starting an SSRI. Keep in mind, antidepressants are more likely to reduce suicide risk in the long run by improving mood.   SEROTONIN SYNDROME:  Discussed risks of Serotonin syndrome (ie, serotonin toxicity) which is a potentially life-threatening condition associated with increased serotonergic activity in the central nervous system (CNS). It is seen with therapeutic medication use, inadvertent interactions between drugs, and intentional self-poisoning. Serotonin syndrome may involve a spectrum of clinical findings, which often include mental status changes, autonomic hyperactivity, and neuromuscular abnormalities.    BENZODIAZEPINE:  discussion on how benzos work and the need to use them short term due to potential of anxiety getting.  This is a controlled substance with risk for abuse, need to keep in a safe keep place and cannot replace lost scripts.    HYPNOTIC USE: Hypnotic use, risk for CNS depression, sleep-walking, not to mix with ETOH or other CNS depressant, need for six hours of sleep, stop if change in mood.  This is a controlled substance with risk for abuse, need to keep in a safe keep place and cannot replace lost scripts.  FIRST GENERATION ANTIPSYCHOTIC/ SECOND GENERATION ANTIPSYCHOTIC USE:  Atypical need for cardiometabolic monitoring with medication- B/P, weight, blood sugar, cholesterol.  Need to monitor for abnormal  movements taught  SAFETY:  We all care about your loved one's safety. To reduce the risk of self-harm, remove access to all:  Firearms, Medicines (both prescribed and over-the-counter), Knives and other sharp objects, Ropes and like materials, and Alcohol  SLEEP HYGIENE: establish a sleep routine, limit screen time 1 hour prior to bed, use bed for sleep only, take sleep/medications on time (including sleepy time tea, trazadone or herbal treatments such as melatonin), aroma therapy, limit caffeine/sugar, yoga, guided imagery, stretch, meditation, limit naps to 20 minutes, make a temperature change in the room, white noise, be mindful of slowing down breathing, take a warm bath/shower, frequently wash sheets, and journaling.   Medlineplus.gov is information for patients.  It is run by the [employer] and it contains information about all disorders, diseases and all medications.      COMMUNITY RESOURCES:    CRISIS NUMBERS: Provided in AVS 10/3/2023  National Suicide Prevention Lifeline: 0-013-541-TALK (088-829-8115)  [employer]/resources for a list of additional resources (SOS)            [facility] - [phone]   Urgent Care Adult Mental Txozvz-[phone] mobile unit/ 24/7 crisis line  [facility] -[phone]   COPE 24/7 [place] Mobile Team -[phone] (adults)/ [phone] (child)  Poison Control Center - 1-323.730.1110    OR  go to nearest ER  Crisis Text Line for any crisis 24/7 send this-   To: 969500   [facility] ([facility] [place] [facility]  [phone]  National Suicide Prevention Lifeline: 486.219.8187 (TTY: 355.186.4226). Call anytime for help.  (www.suicidepreventionlifeline.org)  National [place] on Mental Illness (www.art.org): 748.961.3040 or 478-685-7230.   Mental Health Association (www.mentalhealth.org): 224.156.8641 or 339-058-8074.  [place] Crisis Text Line: Text [place] to 794150  Suicide LifeLine Chat:  suicidepreventionlifeline.org/chat    ADMINISTRATIVE BILLIN spent interviewing patient, reviewing referral documents, obtaining and reviewing outside records, communication with other health specialists, and preparing this report on this day: 10/31/23    Video/Phone Start Time:  4:05 pm  Video/Phone End Time:   4:19 pm    Greater than 50% of time was spent in counseling and coordination of care regarding above diagnoses and treatment plan.    Patient Status:  Our psychiatry providers act as a specialty service for Primary Care Providers in the [facility] that seek to optimize medications for unstable patients.  Once medications have been optimized, our providers discharge the patient back to the referring Primary Care Provider for ongoing medication management.  This type of system allows our providers to serve a high volume of patients. At this time  Patient will continue to be seen for ongoing consultation and stabilization.    Signed:   Boyd Johnson, MSN, APRN, PMHNP-BC  Long Term Outpatient Psychiatry  Chart documentation done in part with Dragon Voice Recognition software.  Although reviewed after completion, some word and grammatical errors may remain. Answers submitted by the patient for this visit:  Patient Health Questionnaire (Submitted on 10/2/2023)  If you checked off any problems, how difficult have these problems made it for you to do your work, take care of things at home, or get along with other people?: Somewhat difficult  PHQ9 TOTAL SCORE: 13  KADEN-7 (Submitted on 10/2/2023)  KADEN 7 TOTAL SCORE: 18

## 2023-10-31 NOTE — NURSING NOTE
Is the patient currently in the state of [place]? YES    Visit mode:VIDEO    If the visit is dropped, the patient can be reconnected by: VIDEO VISIT: Text to cell phone:   Telephone Information:   Mobile [phone]       Will anyone else be joining the visit? NO  (If patient encounters technical issues they should call [phone]    How would you like to obtain your AVS? MyChart    Are changes needed to the allergy or medication list? No    Reason for visit: RECHECK    Luis Carlos MCDONOUGH

## 2023-10-31 NOTE — PROGRESS NOTES
Virtual Visit Details    Type of service:  Video Visit     Originating Location (pt. Location): Home    Distant Location (provider location):  Off-site  Platform used for Video Visit: Antony

## 2023-10-31 NOTE — PATIENT INSTRUCTIONS
"Patient Education   [facility]  What to Expect  Here's what to expect in the [facility].   About the program  You'll be meeting with a psychiatric doctor to check your mental health. A psychiatric doctor helps you deal with troubling thoughts and feelings by giving you medicine. They'll make sure you know the plan for your care. You may see them for a long time. When you're feeling better, they may refer you back to seeing your family doctor.   If you have any questions, we'll be glad to talk to you.  About visits  Be open  At your visits, please talk openly about your problems. It may feel hard, but it's the best way for us to help you.  Cancelling visits  If you can't come to your visit, please call us right away at [phone]. If you don't cancel at least 24 hours (1 full day) before your visit, that's \"late cancellation.\"  Not showing up for your visits  Being very late is the same as not showing up. You'll be a \"no show\" if:  You're more than 15 minutes late for a 30-minute (half hour) visit.  You're more than 30 minutes late for a 60-minute (full hour) visit.  If you cancel late or don't show up 2 times within 6 months, we may end your care.  Getting help between visits  If you need help between visits, you can call us Monday to Friday from 8 a.m. to 4:30 p.m. at [phone].  Emergency care  Call 911 or go to the nearest emergency department if your life or someone else's life is in danger.  Call 988 anytime to reach the national Suicide and Crisis hotline.  Medicine refills  To refill your medicine, call your pharmacy. You can also call [facility] at [phone], Monday to Friday, 8 a.m. to 4:30 p.m. It can take 1 to 3 business days to get a refill.   Forms, letters, and tests  You may have papers to fill out, like FMLA, short-term disability, and workability. We can help you with these forms at your visits, but you must have an " appointment. You may need more than 1 visit for this, to be in an intensive therapy program, or both.  Before we can give you medicine for ADHD, we may refer you to get tested for it or confirm it another way.  We may not be able to give you an emotional support animal letter.  We don't do mental health checks ordered by the court.   We don't do mental health testing, but we can refer you to get tested.   Thank you for choosing us for your care.  For informational purposes only. Not to replace the advice of your health care provider. Copyright   2022 [facility]. All rights reserved. "[employer] 520677 - 12/22.     Plan:  1.Patient will take the medications as prescribed.   Medications: Take Abilify 5 mg daily for mood, depression and anxiety  Continue Guanfacine 1 mg daily for ADHD and irritability and anger  Patient will not stop taking medications or adjust them without consulting with the provider.  2.Patient will call with any problems between visits.  3.Patient will go to the emergency room if not feeling safe , unable to function in the community, or if suicidal, homicidal or hearing voices or having paranoia.  4.Patient will abstain from drugs and alcohol./Pt denies use .  5.Patient will not drive if sedated on medications or under influence of any substance.   6.Patient will not mix psychiatric medications with drugs and alcohol.   7.Patient will watch his diet and exercise.  8.Patient will see non psychiatric providers for non psychiatric disorders.  9. Next appointment in one  month    03-Apr-2019 20:00

## 2023-11-27 ASSESSMENT — ANXIETY QUESTIONNAIRES
5. BEING SO RESTLESS THAT IT IS HARD TO SIT STILL: MORE THAN HALF THE DAYS
7. FEELING AFRAID AS IF SOMETHING AWFUL MIGHT HAPPEN: MORE THAN HALF THE DAYS
GAD7 TOTAL SCORE: 14
2. NOT BEING ABLE TO STOP OR CONTROL WORRYING: MORE THAN HALF THE DAYS
4. TROUBLE RELAXING: MORE THAN HALF THE DAYS
1. FEELING NERVOUS, ANXIOUS, OR ON EDGE: MORE THAN HALF THE DAYS
6. BECOMING EASILY ANNOYED OR IRRITABLE: MORE THAN HALF THE DAYS
3. WORRYING TOO MUCH ABOUT DIFFERENT THINGS: MORE THAN HALF THE DAYS
IF YOU CHECKED OFF ANY PROBLEMS ON THIS QUESTIONNAIRE, HOW DIFFICULT HAVE THESE PROBLEMS MADE IT FOR YOU TO DO YOUR WORK, TAKE CARE OF THINGS AT HOME, OR GET ALONG WITH OTHER PEOPLE: SOMEWHAT DIFFICULT

## 2023-11-27 ASSESSMENT — PATIENT HEALTH QUESTIONNAIRE - PHQ9: SUM OF ALL RESPONSES TO PHQ QUESTIONS 1-9: 9

## 2023-11-28 ENCOUNTER — VIRTUAL VISIT (OUTPATIENT)
Dept: PSYCHIATRY | Facility: CLINIC | Age: 28
End: 2023-11-28
Payer: COMMERCIAL

## 2023-11-28 DIAGNOSIS — F90.9 ATTENTION DEFICIT HYPERACTIVITY DISORDER (ADHD), UNSPECIFIED ADHD TYPE: ICD-10-CM

## 2023-11-28 DIAGNOSIS — F41.1 GAD (GENERALIZED ANXIETY DISORDER): ICD-10-CM

## 2023-11-28 DIAGNOSIS — F33.1 MAJOR DEPRESSIVE DISORDER, RECURRENT EPISODE, MODERATE (H): Primary | ICD-10-CM

## 2023-11-28 DIAGNOSIS — F17.200 TOBACCO USE DISORDER: ICD-10-CM

## 2023-11-28 DIAGNOSIS — F60.3 BORDERLINE PERSONALITY DISORDER (H): ICD-10-CM

## 2023-11-28 PROCEDURE — 99214 OFFICE O/P EST MOD 30 MIN: CPT | Mod: 95 | Performed by: NURSE PRACTITIONER

## 2023-11-28 RX ORDER — GUANFACINE 1 MG/1
2 TABLET, EXTENDED RELEASE ORAL AT BEDTIME
Qty: 60 TABLET | Refills: 1 | Status: SHIPPED | OUTPATIENT
Start: 2023-11-28 | End: 2023-11-29

## 2023-11-28 RX ORDER — BUSPIRONE HYDROCHLORIDE 10 MG/1
10 TABLET ORAL 2 TIMES DAILY
Qty: 60 TABLET | Refills: 1 | Status: SHIPPED | OUTPATIENT
Start: 2023-11-28

## 2023-11-28 ASSESSMENT — PATIENT HEALTH QUESTIONNAIRE - PHQ9
10. IF YOU CHECKED OFF ANY PROBLEMS, HOW DIFFICULT HAVE THESE PROBLEMS MADE IT FOR YOU TO DO YOUR WORK, TAKE CARE OF THINGS AT HOME, OR GET ALONG WITH OTHER PEOPLE: SOMEWHAT DIFFICULT
SUM OF ALL RESPONSES TO PHQ QUESTIONS 1-9: 9

## 2023-11-28 ASSESSMENT — ANXIETY QUESTIONNAIRES: GAD7 TOTAL SCORE: 14

## 2023-11-28 ASSESSMENT — PAIN SCALES - GENERAL: PAINLEVEL: NO PAIN (0)

## 2023-11-28 NOTE — NURSING NOTE
Is the patient currently in the state of [place]? YES    Visit mode:TELEPHONE    If the visit is dropped, the patient can be reconnected by: TELEPHONE VISIT: Phone number:   Telephone Information:   Mobile [phone]       Will anyone else be joining the visit? NO  (If patient encounters technical issues they should call [phone])    How would you like to obtain your AVS? MyChart    Are changes needed to the allergy or medication list? No    Reason for visit: RECHECK    Luis Carlos MCDONOUGH

## 2023-11-28 NOTE — PROGRESS NOTES
"Virtual Visit Details    Type of service:  Video Visit     Originating Location (pt. Location): {video visit patient location:049154::\"Home\"}  {PROVIDER LOCATION On-site should be selected for visits conducted from your clinic location or adjoining [facility] hospital, academic office, or other nearby [facility] building. Off-site should be selected for all other provider locations, including home:946641}  Distant Location (provider location):  {virtual location provider:470738}  Platform used for Video Visit: {Virtual Visit Platforms:504323::\"CorkShare\"}  "

## 2023-11-28 NOTE — PROGRESS NOTES
"PSYCHIATRIC PROGRESS NOTE     Name:  Alhaji Sanchez  : 1995    Alhaji Sanchez is a 28 year old male who is being evaluated via a billable Telephone visit.      Telemedicine Visit: The patient's condition can be safely assessed and treated via synchronous audio and visual telemedicine encounter.      Reason for Telemedicine Visit: COVID 19 pandemic and the social and physical recommendations by the CDC and MD., Patient has requested telehealth visit, and Patient unable to travel      Originating Site (Patient Location): Patient's home    Distant Site (Provider Location): [facility] Out[facility] Setting: [facility]    Consent:  The patient/guardian has verbally consented to: the potential risks and benefits of telemedicine (video visit or phone) versus in person care; bill my insurance or make self-payment for services provided; and responsibility for payment of non-covered services.     Mode of Communication:  CHEQROOM Telephone platform     As the provider I attest to compliance with applicable laws and regulations related to telemedicine.     Date of last Visit: 10/31/23                                         CHIEF COMPLAINT   \"Doing better\"    HISTORY OF PRESENT ILLNESS     Patient who was last seen in the clinic on 10/31/23 returned today for follow up visit.  Patient reports he has been having increased anxiety and feeling of overwhelming related to ongoing moving process to his fiancée's mother's place.  Patient reports he is not sure if this is the right move for them, but stating they are planning to save some money to buy their own house.  Patient reports the fact that he will be starting a new job in the first week of December while planning to propose to his girlfriend  during this time period  also exacerbated symptoms.  Patient reports having passive suicidal thoughts but denies plan or intent.  I suggested that the patient on buspirone 10 mg twice daily to further help with " "increased anxiety.  I discussed medication side effect, risk, and benefit with the patient.  Patient verbalized good understanding and he was amenable to taking buspirone 10 mg twice daily to help with anxiety.  I also titrated guanfacine to 2 mg at bedtime to further help with poor concentration, irritability, and impulsivity. He also denied both auditory and visual hallucination.  Patient report normal no hypomania.  Patient return to clinic in 4 weeks for follow-up.  PSYCHIATRIC HISTORY:   Previous psychiatry: Hx psychiatry for many years through [facility] last 2019  Previous therapist: Hx therapy through [facility] last 2019  History of Psychiatric Hospitalizations:   - Inpatient: 2010 x 3w for HI; 2018 for SI; says 5 inpt hosp. Between [id number]  - IOP/PHP/Day treatment: DBT in remote past, not helpful. PHP 2017  History of Suicidal Ideation: chronic SI; no plan or intent today  History of Suicide Attempts: per record   \"Reports numerous suicide attempts including \"trying to hang myself, choking myself out, laying in front of the railroad tracks, jumping off a bridge\"\"  History of Self-injurious Behavior: cutting  History of impulsivity: Yes   History of Violence/Aggression: HI in 2010 and subsequent 3w hospitalization.   History of Commitment? No   Electroconvulsive Therapy (ECT) or Transcranial Magnetic Stimulation (TMS): No   PharmacogenomicTesting (such as [employer]): No    PSYCHIATRIC REVIEW OF SYSTEMS:   Psychiatric Review of Systems:   Depression:   Reports: depressed mood, passive suicidal ideation, decreased interest, changes in sleep, changes in appetite, guilt, hopelessness, helplessness, impaired concentration, decreased energy, irritability.  Christi:   Denies: sleeplessness, increased goal-directed activities, abrupt increase in energy pressured speech  Psychosis:   Denies: visual hallucinations, auditory hallucinations, paranoia  Anxiety:   Reports: excessive worries that are difficult to control, panic " "attacks  PTSD:   Denies: re-experiencing past trauma, nightmares, increased arousal, avoidance of traumatic stimuli, impaired function.  OCD:   Denies: obsessions, checking, symmetry, cleaning, skin picking.  Eating Disorder:   Denies: restriction, binging, purging.    Sleep: Denies       MEDICATIONS                                                                                                Current Outpatient Medications   Medication Sig    ARIPiprazole (ABILIFY) 5 MG tablet Take 1 tablet (5 mg) by mouth daily    cloNIDine (CATAPRES) 0.1 MG tablet Take 1 tablet (0.1 mg) by mouth 2 times daily for 90 days (Patient not taking: Reported on 10/3/2023)    guanFACINE (INTUNIV) 1 MG TB24 24 hr tablet Take 1 tablet (1 mg) by mouth At Bedtime     No current facility-administered medications for this visit.       DRUG MONITORING:  [place] Prescription Monitoring Program evaluating controlled substances in the last year in [place]:  The [place] Prescription Monitoring Program has been reviewed and there are no current concerns with: diversionary activity, early refill requests, and or obtaining the medication from multiple providers       PAST PSYCHOTROPIC MEDICATIONS:  Lithium  Quetiapine  Lorazepam  Adderall - low appetite  Amitriptyline  Escitalopram  Hydroxyzine  Duloxetine  Sertraline  Vyvanse  Lurasidone  Trazodone  Desvenlafaxine  Bupropion   Haloperidol  Lamotrigine - caused anger  Propranolol - for headaches, not sure if it helped for anxiety    VITALS   There were no vitals taken for this visit.     BP Readings from Last 1 Encounters:   05/19/23 123/75     Pulse Readings from Last 1 Encounters:   05/19/23 75     Wt Readings from Last 1 Encounters:   05/19/23 72.6 kg (160 lb)     Ht Readings from Last 1 Encounters:   05/19/23 1.74 m (5' 8.5\")     Estimated body mass index is 23.97 kg/m  as calculated from the following:    Height as of 5/19/23: 1.74 m (5' 8.5\").    Weight as of 5/19/23: 72.6 kg (160 " "lb).      PERTINENT HISTORY   PAST MEDICAL HISTORY:   Past Medical History:   Diagnosis Date    Depressive disorder     Katelyn been dealing with depression  and anxiety.and adhd sience i was 8 or 9. I was also diagnosed  with borderline  personality disorder back in 2018       PAST SURGICAL HISTORY: No past surgical history on file.    FAMILY HISTORY:   Family History   Problem Relation Age of Onset    Depression Mother     Anxiety Disorder Mother     Thyroid Disease Maternal Grandmother     Asthma Sister        SOCIAL HISTORY:   Social History     Tobacco Use    Smoking status: Every Day     Types: Dip, chew, snus or snuff, Other     Passive exposure: Never    Smokeless tobacco: Current     Types: Chew   Substance Use Topics    Alcohol use: Yes         Seizures or Head Injury: Denies history of head injury. Denies history of seizures.  History of cardiac disease, rheumatic fever, fainting or dizziness, especially with exercise, seizures, chest pain or shortness of breath with exercise, unexplained change in exercise tolerance, palpitations, high blood pressure, or heart murmur?   No    LABS & IMAGING                                                                                                                Personally reviewed  Recent Labs   Lab Test 04/13/23  1002   WBC 7.7   HGB 16.1   HCT 46.4   MCV 84        Recent Labs   Lab Test 04/13/23  1002      POTASSIUM 4.2   CHLORIDE 110*   CO2 28   GLC 99   FRANCES 9.0   BUN 12   CR 1.01   GFRESTIMATED >90   ALBUMIN 4.4   PROTTOTAL 7.5   AST 19   ALT 24   ALKPHOS 75   BILITOTAL 0.6     No lab results found.  Recent Labs   Lab Test 04/13/23  1002   TSH 1.53     No results found for: \"LED080\", \"HNBE484\", \"JVGW73YNIOM\", \"VITD3\", \"D2VIT\", \"D3VIT\", \"DTOT\", \"GB87827463\", \"QB32639594\", \"[id number]\", \"TB42391823\", \"[id number]\", \"[id number]\"     ALLERGY & IMMUNIZATIONS       Allergies   Allergen Reactions    Lurasidone Other (See Comments)    Mold      Other " reaction(s): Runny Nose    Neomycin Unknown     burning in ears- ear drops       FAMILY MEDICAL HISTORY:     Family History       Problem (# of Occurrences) Relation (Name,Age of Onset)    Anxiety Disorder (1) Mother (Neha)    Asthma (1) Sister (Ave)    Depression (1) Mother (Neha)    Thyroid Disease (1) Maternal Grandmother (Cherry)              Family history of sudden or unexplained death or an event requiring resuscitation in children or young adults, cardiac arrhythmias (eg, Reza-Parkinson-White syndrome), long QT syndrome, catecholaminergic paroxysmal ventricular tachycardia, Brugada syndrome, arrhythmogenic right ventricular dysplasia, hypertrophic cardiomyopathy, dilated cardiomyopathy, or Marfan syndrome?  No    FAMILY PSYCHIATRIC HISTORY:   Psychiatry:Mom struggle with depression and anxiety, and sister deals with anxiety  Substance use history in family: Negative  Family suicide history:Mother attempted suicide      SIGNIFICANT SOCIAL/FAMILY HISTORY:                                           Born and raised in: [place]  Relationship status: Single, has a girlfriend, living together   Children: 2 kids of her girlfriend     Highest education level was:Some college   Service:Denies  Employment status: Works full time  LEGAL:Denies     SUBSTANCE USE HISTORY    Tobacco use: E-cigarette, and chew tobacco   Caffeine: 2 Cups of coffee in the morning and energy drinks, and some pop  Current alcohol:   A few beers per night. Hx alcohol abuse.    Current substance use: denies  Past use alcohol/substance use:cannabis last 2018, amphetamines rx last 2020, hallucinogens in the past, benzos last rx 2019       MEDICAL REVIEW OF SYSTEMS:   Ten system review was completed with pertinent positives noted above    MENTAL STATUS EXAM:   Mental Status Examination (limited due to video virtual visit format):  Vital Signs: There were no vitals taken for this virtual visit.  Appearance: adequately groomed, appears  "stated age, and in no apparent distress.  Attitude: cooperative   Eye Contact: good to the extent that can be determined in a video visit  Muscle Strength and Tone: no gross abnormalities based on remote observation  Psychomotor Behavior:  no evidence of tardive dyskinesia, dystonia, or tics based on remote observation  Gait and Station: normal, no gross abnormalities based on remote observation  Speech: clear, coherent, normal prosody, regular rate, regular rhythm and fluent  Associations: No loosening of associations  Thought Process: coherent and goal directed  Thought Content: no evidence of suicidal ideation or homicidal ideation, no evidence of psychotic thought, no auditory hallucinations present and no visual hallucinations present  Mood: \"irritable\"  Affect: appropriate and in normal range  Insight: good  Judgment: intact, adequate for safety  Impulse Control: intact  Oriented to: time, place, person and situation  Attention Span and Concentration: normal  Language: Intact  Recent and Remote Memory: intact to interview. Not formally assessed. No amnesia.  Fund of Knowledge: appropriate        SAFETY   Feels safe in home: Yes   Suicidal ideation: Denies  History of suicide attempts:  No   Hx of impulsivity: No     RISK AND PROTECTIVE FACTORS     Static Risk Factors: sex, prior attempts, serious mental illness, history of MH diagnoses and/or treatment, history of hospitalization and impulsivity  Firearms/Weapons Access: Yes pt has \"a lot of firearms.\" Going to the range and hunting are \"coping mechanisms. I love hunting.\" Says he would never shoot himself because \"I don't want anyone to clean that up.\"   Dynamic Risk Factors: SI, emotional distress, substance use, anxiety, dramatic changes in mood, access to means and mental health stigma     Protective Factors: hope for the future, compliance with medication, future oriented, healthy intimate relationships, resilience, perceived internal locus of control, " social support, access to care as needed, motivation and readiness for change, reasons for living and displaying help seeking behavior    DSM 5 DIAGNOSIS:   1. Attention deficit hyperactivity disorder (ADHD), unspecified ADHD type    2. KADEN (generalized anxiety disorder)    3. Major depressive disorder, recurrent episode, moderate (H)    4. Borderline personality disorder (H)    5. Nicotine dependence, uncomplicated, unspecified nicotine product type      ASSESSMENT AND PLAN    Patient is a 28 year old,  White Not  or  male with a history of major depressive disorder, recurrent episode, moderate, ADHD, unspecified ADHD type, generalized anxiety disorder, borderline personality disorder, and nicotine dependence, uncomplicated, unspecified nicotine product type who presents for follow up visit.  Today, he reports feeling of overwhelming and anxiety related to moving to his 2CRiskkuldip's mother's place while planning to start a new job at the same time making an engagement to propose to his [employer] next months.  He endorsed passive SI but denies intent or plan.  I started patient on buspirone 10 mg twice daily to further help with anxiety.  I also increased guanfacine to 2 mg to help with irritability and impulsivity. He denies SIB and HI.  Patient denies both auditory and visual hallucination.  Patient report no hypomania or hypomania.  Patient return to clinic in 4 weeks for follow-up.    Plan:  1.Patient will take the medications as prescribed.   Medications: Continue Abilify 5 mg daily for mood, depression and anxiety  Take Guanfacine 2 mg daily for ADHD and irritability and anger  Take Buspar 10 mg twice daily for anxiety  Patient will not stop taking medications or adjust them without consulting with the provider.  2.Patient will call with any problems between visits.  3.Patient will go to the emergency room if not feeling safe , unable to function in the community, or if suicidal, homicidal or hearing  voices or having paranoia.  4.Patient will abstain from drugs and alcohol./Pt denies use .  5.Patient will not drive if sedated on medications or under influence of any substance.   6.Patient will not mix psychiatric medications with drugs and alcohol.   7.Patient will watch his diet and exercise.  8.Patient will see non psychiatric providers for non psychiatric disorders.  9. Next appointment in 5-6 weeks    Risk Assessment:     Alhaji has notable risk factors for self-harm, including, single status, anxiety, chronic SI and hx of suicide attempts and passive SI. However, risk is mitigated by ability to volunteer a safety plan and history of seeking help when needed. Additional steps taken to minimize risk include making medication adjustment, asking patient to call 911 and go to the ER if not able to stay safe at home,  Therefore, based on all available evidence including the factors cited above, Alhaji does not appear to be an imminent danger to self or others and does not meet criteria 72 hour hold. However, if patient uses substances or is non-adherent with medication, their risk of decompensation and SI/HI will be elevated. This was discussed with the patient as she verbalized good understanding.   CONSULTS/REFERRALS:   Continue therapy  None at this time  Coordinate care with therapist as needed    MEDICAL:   None at this time  Coordinate care with PCP (Gerry Vuong) as needed  Follow up with primary care provider as planned or for acute medical concerns.    PSYCHOEDUCATION:  Medication side effects and alternatives reviewed. Health promotion activities recommended and reviewed today. All questions addressed. Education and counseling completed regarding risks and benefits of medications and psychotherapy options.  Consent provided by patient/guardian  Call the psychiatric nurse line with medication questions or concerns at [phone].  [employer] may be used to communicate with your provider, but this is not  intended to be used for emergencies.  BLACK BOX WARNING: Discussed the Food and Drug Administration (FDA) requires that all antidepressants carry a warning that some children, adolescents and young adults may be at increased risk of suicide when taking antidepressants. Anyone taking an antidepressant should be watched closely for worsening depression or unusual behavior especially in the first few weeks after starting an SSRI. Keep in mind, antidepressants are more likely to reduce suicide risk in the long run by improving mood.   SEROTONIN SYNDROME:  Discussed risks of Serotonin syndrome (ie, serotonin toxicity) which is a potentially life-threatening condition associated with increased serotonergic activity in the central nervous system (CNS). It is seen with therapeutic medication use, inadvertent interactions between drugs, and intentional self-poisoning. Serotonin syndrome may involve a spectrum of clinical findings, which often include mental status changes, autonomic hyperactivity, and neuromuscular abnormalities.    BENZODIAZEPINE:  discussion on how benzos work and the need to use them short term due to potential of anxiety getting.  This is a controlled substance with risk for abuse, need to keep in a safe keep place and cannot replace lost scripts.    HYPNOTIC USE: Hypnotic use, risk for CNS depression, sleep-walking, not to mix with ETOH or other CNS depressant, need for six hours of sleep, stop if change in mood.  This is a controlled substance with risk for abuse, need to keep in a safe keep place and cannot replace lost scripts.  FIRST GENERATION ANTIPSYCHOTIC/ SECOND GENERATION ANTIPSYCHOTIC USE:  Atypical need for cardiometabolic monitoring with medication- B/P, weight, blood sugar, cholesterol.  Need to monitor for abnormal movements taught  SAFETY:  We all care about your loved one's safety. To reduce the risk of self-harm, remove access to all:  Firearms, Medicines (both prescribed and  over-the-counter), Knives and other sharp objects, Ropes and like materials, and Alcohol  SLEEP HYGIENE: establish a sleep routine, limit screen time 1 hour prior to bed, use bed for sleep only, take sleep/medications on time (including sleepy time tea, trazadone or herbal treatments such as melatonin), aroma therapy, limit caffeine/sugar, yoga, guided imagery, stretch, meditation, limit naps to 20 minutes, make a temperature change in the room, white noise, be mindful of slowing down breathing, take a warm bath/shower, frequently wash sheets, and journaling.   Medlineplus.gov is information for patients.  It is run by the [employer] and it contains information about all disorders, diseases and all medications.      COMMUNITY RESOURCES:    CRISIS NUMBERS: Provided in AVS 10/3/2023  National Suicide Prevention Lifeline: 2-742-268-TALK (627-568-8824)  [place]/resources for a list of additional resources (SOS)            [facility] - [phone]   Urgent Care Adult Mental Yzgixw-[phone] mobile unit/  crisis line  [facility] -[phone]   COPE  [place] Mobile Team -[phone] (adults)/ [phone] (child)  Poison Control Center - 1-723.293.1728    OR  go to nearest ER  Crisis Text Line for any crisis  send this-   To: 409488   [facility] ([facility]  [phone]  National Suicide Prevention Lifeline: 280.963.1070 (TTY: 277.946.8791). Call anytime for help.  (www.suicidepreventionlifeline.org)  National [employer] Mental Illness (www.art.org): 134.865.5436 or 881-219-4460.   Mental Health Association (www.mentalhealth.org): 800.707.6814 or 840-840-6751.  [place] Crisis Text Line: Text [place] to 938077  Suicide LifeLine Chat: suicideLuxe Internacionale.org/chat    ADMINISTRATIVE BILLIN spent interviewing patient, reviewing referral documents, obtaining and reviewing outside records, communication with other  health specialists, and preparing this report on this day: 11/28/23    Video/Phone Start Time:  4:03 pm  Video/Phone End Time:   4:14 pm    Greater than 50% of time was spent in counseling and coordination of care regarding above diagnoses and treatment plan.    Patient Status:  Our psychiatry providers act as a specialty service for Primary Care Providers in the [facility] that seek to optimize medications for unstable patients.  Once medications have been optimized, our providers discharge the patient back to the referring Primary Care Provider for ongoing medication management.  This type of system allows our providers to serve a high volume of patients. At this time  Patient will continue to be seen for ongoing consultation and stabilization.    Signed:   Boyd Johnson, MSN, APRN, PMHNP-BC  Long Term Outpatient Psychiatry  Chart documentation done in part with Dragon Voice Recognition software.  Although reviewed after completion, some word and grammatical errors may remain. Answers submitted by the patient for this visit:  Patient Health Questionnaire (Submitted on 10/2/2023)  If you checked off any problems, how difficult have these problems made it for you to do your work, take care of things at home, or get along with other people?: Somewhat difficult  PHQ9 TOTAL SCORE: 13  KADEN-7 (Submitted on 10/2/2023)  KADEN 7 TOTAL SCORE: 18    Answers submitted by the patient for this visit:  Patient Health Questionnaire (Submitted on 11/28/2023)  If you checked off any problems, how difficult have these problems made it for you to do your work, take care of things at home, or get along with other people?: Somewhat difficult  PHQ9 TOTAL SCORE: 9  KADEN-7 (Submitted on 11/28/2023)  KADEN 7 TOTAL SCORE: 14

## 2023-11-28 NOTE — PATIENT INSTRUCTIONS
"Patient Education   The [facility]  What to Expect  Here's what to expect in the [facility].   About the program  You'll be meeting with a psychiatric doctor to check your mental health. A psychiatric doctor helps you deal with troubling thoughts and feelings by giving you medicine. They'll make sure you know the plan for your care. You may see them for a long time. When you're feeling better, they may refer you back to seeing your family doctor.   If you have any questions, we'll be glad to talk to you.  About visits  Be open  At your visits, please talk openly about your problems. It may feel hard, but it's the best way for us to help you.  Cancelling visits  If you can't come to your visit, please call us right away at [phone]. If you don't cancel at least 24 hours (1 full day) before your visit, that's \"late cancellation.\"  Not showing up for your visits  Being very late is the same as not showing up. You'll be a \"no show\" if:  You're more than 15 minutes late for a 30-minute (half hour) visit.  You're more than 30 minutes late for a 60-minute (full hour) visit.  If you cancel late or don't show up 2 times within 6 months, we may end your care.  Getting help between visits  If you need help between visits, you can call us Monday to Friday from 8 a.m. to 4:30 p.m. at [phone].  Emergency care  Call 911 or go to the nearest emergency department if your life or someone else's life is in danger.  Call 988 anytime to reach the national Suicide and Crisis hotline.  Medicine refills  To refill your medicine, call your pharmacy. You can also call [facility] at [phone], Monday to Friday, 8 a.m. to 4:30 p.m. It can take 1 to 3 business days to get a refill.   Forms, letters, and tests  You may have papers to fill out, like FMLA, short-term disability, and workability. We can help you with these forms at your visits, but you must have an " appointment. You may need more than 1 visit for this, to be in an intensive therapy program, or both.  Before we can give you medicine for ADHD, we may refer you to get tested for it or confirm it another way.  We may not be able to give you an emotional support animal letter.  We don't do mental health checks ordered by the court.   We don't do mental health testing, but we can refer you to get tested.   Thank you for choosing us for your care.  For informational purposes only. Not to replace the advice of your health care provider. Copyright   2022 [facility]. All rights reserved. [employer] 721040 - 12/22.     Plan:  1.Patient will take the medications as prescribed.   Medications: Continue Abilify 5 mg daily for mood, depression and anxiety  Take Guanfacine 2 mg daily for ADHD and irritability and anger  Take Buspar 10 mg twice daily for anxiety  Patient will not stop taking medications or adjust them without consulting with the provider.  2.Patient will call with any problems between visits.  3.Patient will go to the emergency room if not feeling safe , unable to function in the community, or if suicidal, homicidal or hearing voices or having paranoia.  4.Patient will abstain from drugs and alcohol./Pt denies use .  5.Patient will not drive if sedated on medications or under influence of any substance.   6.Patient will not mix psychiatric medications with drugs and alcohol.   7.Patient will watch his diet and exercise.  8.Patient will see non psychiatric providers for non psychiatric disorders.  9. Next appointment in 5-6 weeks

## 2023-11-29 DIAGNOSIS — F41.1 GAD (GENERALIZED ANXIETY DISORDER): ICD-10-CM

## 2023-11-29 DIAGNOSIS — F90.9 ATTENTION DEFICIT HYPERACTIVITY DISORDER (ADHD), UNSPECIFIED ADHD TYPE: Primary | ICD-10-CM

## 2023-11-29 DIAGNOSIS — F33.1 MAJOR DEPRESSIVE DISORDER, RECURRENT EPISODE, MODERATE (H): ICD-10-CM

## 2023-11-29 DIAGNOSIS — R41.840 POOR CONCENTRATION: ICD-10-CM

## 2023-11-29 RX ORDER — GUANFACINE 2 MG/1
2 TABLET, EXTENDED RELEASE ORAL AT BEDTIME
Qty: 30 TABLET | Refills: 1 | Status: SHIPPED | OUTPATIENT
Start: 2023-11-29

## 2023-11-29 NOTE — TELEPHONE ENCOUNTER
Received a PA request for guanFACINE (INTUNIV) 1 MG TB24 24 hr tablets. With the following message:      RN merline'd new script for 2 mg tablets per insurance requirements and place a note to pharmacy to discontinue the previous order.     Routing high priority for provider review.    Farrah Mcdermott RN on 11/29/2023 at 11:36 AM

## 2024-02-12 ENCOUNTER — OFFICE VISIT (OUTPATIENT)
Dept: URGENT CARE | Facility: URGENT CARE | Age: 29
End: 2024-02-12

## 2024-02-12 VITALS
TEMPERATURE: 99.4 F | DIASTOLIC BLOOD PRESSURE: 106 MMHG | WEIGHT: 176.2 LBS | HEART RATE: 78 BPM | OXYGEN SATURATION: 98 % | BODY MASS INDEX: 26.4 KG/M2 | SYSTOLIC BLOOD PRESSURE: 152 MMHG | RESPIRATION RATE: 14 BRPM

## 2024-02-12 DIAGNOSIS — R07.0 THROAT PAIN: ICD-10-CM

## 2024-02-12 DIAGNOSIS — J02.0 STREP THROAT: Primary | ICD-10-CM

## 2024-02-12 LAB — DEPRECATED S PYO AG THROAT QL EIA: POSITIVE

## 2024-02-12 PROCEDURE — 99213 OFFICE O/P EST LOW 20 MIN: CPT | Performed by: STUDENT IN AN ORGANIZED HEALTH CARE EDUCATION/TRAINING PROGRAM

## 2024-02-12 PROCEDURE — 87880 STREP A ASSAY W/OPTIC: CPT | Performed by: STUDENT IN AN ORGANIZED HEALTH CARE EDUCATION/TRAINING PROGRAM

## 2024-02-12 RX ORDER — PENICILLIN V POTASSIUM 500 MG/1
500 TABLET, FILM COATED ORAL 2 TIMES DAILY
Qty: 20 TABLET | Refills: 0 | Status: SHIPPED | OUTPATIENT
Start: 2024-02-12 | End: 2024-02-22

## 2024-02-12 RX ORDER — DEXAMETHASONE SODIUM PHOSPHATE 4 MG/ML
12 VIAL (ML) INJECTION ONCE
Status: COMPLETED | OUTPATIENT
Start: 2024-02-12 | End: 2024-02-12

## 2024-02-12 RX ADMIN — Medication 12 MG: at 18:31

## 2024-02-12 NOTE — PROGRESS NOTES
Assessment & Plan     Strep throat  Rapid strep positive. Significant swelling of the tonsils so I advised one time dose of decadron to treat this and reviewed possible side effects. Start penicillin to treat strep infection. Return to work after on antibiotic x 24 hours.   - penicillin V (VEETID) 500 MG tablet  Dispense: 20 tablet; Refill: 0  - dexAMETHasone (DECADRON) injectable solution used ORALLY 12 mg    Throat pain  - Streptococcus A Rapid Screen w/Reflex to PCR - Clinic Collect         No follow-ups on file.    [facility]    Shadia Mane is a 28 year old male who presents to clinic today for the following health issues:  Chief Complaint   Patient presents with    Icsi - Strep     Hurts to swallow, sore throat, Rt ear pain, nausea, mild cough, low grade fevers. Sx started Thursday. Strep exposure.      HPI      Review of Systems  Constitutional, HEENT, cardiovascular, pulmonary, gi and gu systems are negative, except as otherwise noted.      Objective    BP (!) 152/106 (BP Location: Right arm, Cuff Size: Adult Regular)   Pulse 78   Temp 99.4  F (37.4  C) (Tympanic)   Resp 14   Wt 79.9 kg (176 lb 3.2 oz)   SpO2 98%   BMI 26.40 kg/m    Physical Exam   GENERAL: alert and no distress  EYES: Eyes grossly normal to inspection, PERRL and conjunctivae and sclerae normal  HENT: normal cephalic/atraumatic, ear canals and TM's normal, oral mucous membranes moist, tonsillar hypertrophy, tonsillar erythema, and tonsillar exudate  MS: no gross musculoskeletal defects noted, no edema  SKIN: no suspicious lesions or rashes  NEURO: Normal strength and tone, mentation intact and speech normal    Results for orders placed or performed in visit on 02/12/24 (from the past 24 hour(s))   Streptococcus A Rapid Screen w/Reflex to PCR - Clinic Collect    Specimen: Throat; Swab   Result Value Ref Range    Group A Strep antigen Positive (A) Negative

## 2024-02-13 NOTE — PATIENT INSTRUCTIONS
Strep throat - start treatment with penicillin twice daily for 10 days.    Decadron one-time dose for swelling of tonsils.     Laila Gagnon, CNP

## 2024-02-13 NOTE — NURSING NOTE
Clinic Administered Medication Documentation    Patient was given Decadron. Prior to medication administration, verified patient's identity using patient's name and date of birth.    Nancy Jaeger, CMA

## 2024-06-23 ENCOUNTER — HEALTH MAINTENANCE LETTER (OUTPATIENT)
Age: 29
End: 2024-06-23

## 2024-06-24 ENCOUNTER — TELEPHONE (OUTPATIENT)
Dept: FAMILY MEDICINE | Facility: CLINIC | Age: 29
End: 2024-06-24

## 2024-06-24 NOTE — TELEPHONE ENCOUNTER
Patient Quality Outreach    Patient is due for the following:   Depression  -  Depression follow-up visit    Next Steps:   Schedule a office visit for Depression F/U    Type of outreach:    Sent [employer] message.    Next Steps:  Reach out within 90 days via [employer].    Max number of attempts reached: No. Will try again in 90 days if patient still on fail list.    Questions for provider review:    None           Elsi Calzada MA

## 2025-07-12 ENCOUNTER — HEALTH MAINTENANCE LETTER (OUTPATIENT)
Age: 30
End: 2025-07-12